# Patient Record
Sex: MALE | Race: WHITE
[De-identification: names, ages, dates, MRNs, and addresses within clinical notes are randomized per-mention and may not be internally consistent; named-entity substitution may affect disease eponyms.]

---

## 2017-05-11 ENCOUNTER — HOSPITAL ENCOUNTER (EMERGENCY)
Dept: HOSPITAL 77 - KA.ED | Age: 77
Discharge: HOME | End: 2017-05-11
Payer: MEDICARE

## 2017-05-11 VITALS — DIASTOLIC BLOOD PRESSURE: 60 MMHG | SYSTOLIC BLOOD PRESSURE: 132 MMHG

## 2017-05-11 DIAGNOSIS — Z79.82: ICD-10-CM

## 2017-05-11 DIAGNOSIS — E66.9: ICD-10-CM

## 2017-05-11 DIAGNOSIS — Z88.8: ICD-10-CM

## 2017-05-11 DIAGNOSIS — E78.00: ICD-10-CM

## 2017-05-11 DIAGNOSIS — Z87.891: ICD-10-CM

## 2017-05-11 DIAGNOSIS — Z88.5: ICD-10-CM

## 2017-05-11 DIAGNOSIS — F41.9: ICD-10-CM

## 2017-05-11 DIAGNOSIS — I10: ICD-10-CM

## 2017-05-11 DIAGNOSIS — F32.9: ICD-10-CM

## 2017-05-11 DIAGNOSIS — J06.9: Primary | ICD-10-CM

## 2017-05-11 DIAGNOSIS — Z79.899: ICD-10-CM

## 2017-05-11 DIAGNOSIS — Z90.49: ICD-10-CM

## 2017-05-11 DIAGNOSIS — I25.2: ICD-10-CM

## 2017-05-11 DIAGNOSIS — E11.9: ICD-10-CM

## 2017-05-11 DIAGNOSIS — Z88.1: ICD-10-CM

## 2017-05-11 DIAGNOSIS — Z98.49: ICD-10-CM

## 2017-05-11 DIAGNOSIS — K21.9: ICD-10-CM

## 2017-05-11 PROCEDURE — 71020: CPT

## 2017-05-11 PROCEDURE — 99283 EMERGENCY DEPT VISIT LOW MDM: CPT

## 2017-05-11 NOTE — EDM.PDOC
ED HPI GENERAL MEDICAL PROBLEM





- General


Chief Complaint: Respiratory Problem


Stated Complaint: COUGH


Time Seen by Provider: 05/11/17 20:15


Source of Information: Reports: Patient


History Limitations: Reports: No Limitations





- History of Present Illness


INITIAL COMMENTS - FREE TEXT/NARRATIVE: 





78 YO WM presents to ER complaining of nonproductive cough when lying supine. 

Pt reports his wife was seen in the clinic yesterday for similar symptoms and 

she was started on a z-pack, and patient was prescribed a z-pack as well as a 

prophylaxsis due to infection in the home. Pt reports he lied down tonight and 

started coughing prompting ER visit. Pt denies shortness of breath, denies 

chest pain, denies fever/chills. Pt reports nasal congestion with some drainage 

tonight. 


Onset: today


Duration: Day(s): (1)


Severity: mild


Improves with: Reports: Other (sitting up)


Worsens with: Denies: Breathing


Associated Symptoms: Reports: cough.  Denies: chest pain, cough w sputum, fever/

chills, nausea/vomiting, shortness of breath, syncope, weakness





- Related Data


 Allergies











Allergy/AdvReac Type Severity Reaction Status Date / Time


 


omeprazole magnesium Allergy Intermediate Cannot Verified 05/11/17 20:02





[From Prilosec]   Remember  


 


cimetidine [From Tagamet] Allergy  Cannot Verified 05/11/17 20:02





   Remember  


 


cimetidine HCl [From Tagamet] Allergy  Cannot Verified 05/11/17 20:02





   Remember  


 


codeine Allergy  Rash Verified 05/11/17 20:02


 


omeprazole [From Prilosec] Allergy  Cannot Verified 05/11/17 20:02





   Remember  











Home Meds: 


 Home Meds





Aspirin [Adult Low Dose Aspirin EC] 81 mg PO DAILY 01/29/14 [History]


Albuterol [Ventolin HFA] 1 inhaler INH Q4H PRN #1 inhaler 05/05/16 [Rx]


Allopurinol [Zyloprim] 300 mg PO DAILY #90 tablet 05/05/16 [Rx]


Esomeprazole [NexIUM] 40 mg PO ACBREAKFAST #90 cap 05/05/16 [Rx]


Furosemide [Lasix] 20 mg PO DAILY #90 tablet 05/05/16 [Rx]


Gemfibrozil [Lopid] 600 mg PO BIDAC #90 tablet 05/05/16 [Rx]


Simvastatin [Zocor] 20 mg PO BEDTIME #90 tablet 05/05/16 [Rx]


Tamsulosin [Flomax] 0.4 mg PO QAM #90 cap.er 05/05/16 [Rx]


busPIRone [Buspar] 10 mg PO BID #90 tablet 05/05/16 [Rx]


metFORMIN [Glucophage] 1,000 mg PO BIDM #90 tablet 05/05/16 [Rx]


traMADol HCl [Tramadol HCl] 50 mg PO TID PRN 08/06/16 [History]


Lisinopril 20 mg PO BID 01/30/17 [History]


Metoprolol Tartrate [Lopressor] 25 mg PO BID 01/30/17 [History]


chlordiazePOXIDE [Librium] 10 mg PO BID 01/30/17 [History]


Acetaminophen 650 mg PO Q6H PRN 01/31/17 [History]


Nystatin [IJD: Nystatin Cream] 1 gm TOP BID #1 tube 02/06/17 [Rx]


Azithromycin [Azithromycin] 250 mg PO DAILY 05/11/17 [History]


Benzonatate [Tessalon Perles] 100 mg PO TID PRN #20 cap 05/11/17 [Rx]


Cetirizine HCl [Zyrtec] 10 mg PO DAILYRT #30 tablet 05/11/17 [Rx]











Past Medical History


HEENT History: Reports: Cataract, Impaired vision


Cardiovascular History: Reports: High cholesterol, Hypertension, MI


Gastrointestinal History: Reports: Chronic constipation, GERD


Genitourinary History: Reports: BPH


Musculoskeletal History: Reports: Arthritis, Back pain, chronic


Psychiatric History: Reports: Anxiety, Dementia, Depression, Mood swings, Panic 

attack


Endocrine/Metabolic History: Reports: Diabetes, type II, Obesity/BMI 30+





- Infectious Disease History


Infectious Disease History: Reports: Measles





- Past Surgical History


HEENT Surgical History: Reports: Cataract surgery


Cardiovascular Surgical History: Reports: None


Respiratory Surgical History: Reports: None


GI Surgical History: Reports: Cholecystectomy, Colonoscopy, EGD, Hernia, 

abdominal


Endocrine Surgical History: Reports: None


Musculoskeletal Surgical History: Reports: None





Social & Family History





- Family History


HEENT: Reports: None


Cardiac: Reports: Hypertension, MI


Respiratory: Reports: None


GI: Reports: None


: Reports: None


OBGYN: Reports: None


Musculoskeletal: Reports: None


Neurological: Reports: None


Psychiatric: Reports: None


Endocrine/Metabolic: Reports: None


Hematologic: Reports: None


Immunologic: Reports: None


Dermatologic: Reports: None


Oncologic: Reports: Prostate





- Tobacco Use


Smoking Status *Q: Former Smoker


Years of Tobacco use: 5


Packs/Tins Daily: 1


Used Tobacco, but Quit: No


Month Tobacco Last Used: May


Second Hand Smoke Exposure: No





- Caffeine Use


Caffeine Use: Reports: Coffee, Soda, Tea





- Alcohol Use


Days Per Week of Alcohol Use: 7


Number of Drinks Per Day: 7


Total Drinks Per Week: 49





- Recreational Drug Use


Recreational Drug Use: No





- Living Situation & Occupation


Living situation: Reports: 


Occupation: retired





ED ROS GENERAL





- Review of Systems


Review Of Systems: See Below


Constitutional: Reports: No Symptoms


HEENT: Reports: No Symptoms


Respiratory: Reports: Cough.  Denies: Shortness of Breath, Wheezing, Pleuritic 

Chest Pain, Sputum, Hemoptysis


Cardiovascular: Reports: No Symptoms


Endocrine: Reports: No Symptoms


GI/Abdominal: Reports: No Symptoms


: Reports: No Symptoms


Musculoskeletal: Reports: No Symptoms


Skin: Reports: No Symptoms


Neurological: Reports: No Symptoms


Psychiatric: Reports: No Symptoms


Hematologic/Lymphatic: Reports: No Symptoms


Immunologic: Reports: No Symptoms





ED EXAM, GENERAL





- Physical Exam


Exam: See Below


Exam Limited By: No Limitations


General Appearance: Alert, WD/WN, No Apparent Distress


Ears: Normal External Exam, Normal Canal, Hearing Grossly Normal, Normal TMs


Nose: Normal Inspection, Normal Mucosa, No Blood, Clear Rhinorrhea


Throat/Mouth: Normal Inspection, Normal Lips, Normal Teeth, Normal Gums, Normal 

Oropharynx, Normal Voice, No Airway Compromise


Head: Atraumatic, Normocephalic


Neck: Normal Inspection, Supple, Non-Tender, Full Range of Motion


Respiratory/Chest: No Respiratory Distress, Lungs Clear, Normal Breath Sounds, 

No Accessory Muscle Use, Chest Non-Tender


Cardiovascular: Normal Peripheral Pulses, Regular Rate, Rhythm, No Edema, No 

Gallop, No JVD, No Murmur, No Rub


GI/Abdominal: Normal Bowel Sounds, Soft, Non-Tender, No Organomegaly, No 

Distention, No Abnormal Bruit, No Mass


Back Exam: Normal Inspection, Full Range of Motion, NT


Extremities: Normal Inspection, Normal Range of Motion, Non-Tender, Normal 

Capillary Refill, No Pedal Edema


Neurological: Alert, Oriented, CN II-XII Intact, Normal Cognition, Normal Gait, 

Normal Reflexes, No Motor/Sensory Deficits


Psychiatric: Normal Affect, Normal Mood


Skin Exam: Warm, Dry, Intact, Normal Color, No Rash


Lymphatic: No Adenopathy





Course





- Vital Signs


Last Recorded V/S: 


 Last Vital Signs











Temp  36.1 C   05/11/17 20:00


 


Pulse  60   05/11/17 20:00


 


Resp  22 H  05/11/17 20:00


 


BP  132/60   05/11/17 20:00


 


Pulse Ox  96   05/11/17 20:00














- Orders/Labs/Meds


Orders: 


 Active Orders 24 hr











 Category Date Time Status


 


 Chest 2V [CR] Stat Exams  05/11/17 20:31 Taken











Meds: 


Medications














Discontinued Medications














Generic Name Dose Route Start Last Admin





  Trade Name Freq  PRN Reason Stop Dose Admin


 


Diphenhydramine HCl  50 mg  05/11/17 20:43  05/11/17 20:58





  Benadryl  PO  05/11/17 20:44  50 mg





  ONETIME ONE   Administration














Departure





- Departure


Time of Disposition: 21:00


Disposition: Home, Self-Care 01


Condition: good


Clinical Impression: 


 Cough





Upper respiratory infection


Qualifiers:


 URI type: unspecified viral URI Qualified Code(s): J06.9 - Acute upper 

respiratory infection, unspecified








- Discharge Information


Prescriptions: 


Benzonatate [Tessalon Perles] 100 mg PO TID PRN #20 cap


 PRN Reason: Cough


Cetirizine HCl [Zyrtec] 10 mg PO DAILYRT #30 tablet


Instructions:  Upper Respiratory Infection, Adult, Easy-to-Read


Referrals: 


Kayley Alvarado MD [Primary Care Provider] - 


Forms:  ED Department Discharge





- My Orders


Last 24 Hours: 


My Active Orders





05/11/17 20:31


Chest 2V [CR] Stat 














- Assessment/Plan


Last 24 Hours: 


My Active Orders





05/11/17 20:31


Chest 2V [CR] Stat 











Assessment:: 





1. nonproductive cough


2. upper respiratory tract infection


Plan: 





1. zyrtec 10mg PO QD


2. continue z-pack


3. benadryl 50mg PO QHS PRN


4. tesselon perles 100mg PO TID PRN


5. follow up in clinic for recheck or return to ER for worsening symptoms

## 2017-05-17 ENCOUNTER — HOSPITAL ENCOUNTER (OUTPATIENT)
Dept: HOSPITAL 77 - KA.MS | Age: 77
Setting detail: OBSERVATION
LOS: 2 days | Discharge: HOME | End: 2017-05-19
Attending: FAMILY MEDICINE | Admitting: PHYSICIAN ASSISTANT
Payer: MEDICARE

## 2017-05-17 DIAGNOSIS — Z79.84: ICD-10-CM

## 2017-05-17 DIAGNOSIS — R53.1: Primary | ICD-10-CM

## 2017-05-17 DIAGNOSIS — N40.0: ICD-10-CM

## 2017-05-17 DIAGNOSIS — E78.1: ICD-10-CM

## 2017-05-17 DIAGNOSIS — Z87.891: ICD-10-CM

## 2017-05-17 DIAGNOSIS — M10.9: ICD-10-CM

## 2017-05-17 DIAGNOSIS — K21.9: ICD-10-CM

## 2017-05-17 DIAGNOSIS — M13.861: ICD-10-CM

## 2017-05-17 DIAGNOSIS — Z79.899: ICD-10-CM

## 2017-05-17 DIAGNOSIS — Z90.49: ICD-10-CM

## 2017-05-17 DIAGNOSIS — Z88.8: ICD-10-CM

## 2017-05-17 DIAGNOSIS — M13.862: ICD-10-CM

## 2017-05-17 DIAGNOSIS — E11.9: ICD-10-CM

## 2017-05-17 DIAGNOSIS — F03.90: ICD-10-CM

## 2017-05-17 DIAGNOSIS — E78.5: ICD-10-CM

## 2017-05-17 DIAGNOSIS — F41.9: ICD-10-CM

## 2017-05-17 DIAGNOSIS — I10: ICD-10-CM

## 2017-05-17 LAB
CHLORIDE SERPL-SCNC: 100 MMOL/L (ref 98–115)
SODIUM SERPL-SCNC: 144 MMOL/L (ref 136–145)

## 2017-05-17 PROCEDURE — 73020 X-RAY EXAM OF SHOULDER: CPT

## 2017-05-17 PROCEDURE — 96361 HYDRATE IV INFUSION ADD-ON: CPT

## 2017-05-17 PROCEDURE — 85025 COMPLETE CBC W/AUTO DIFF WBC: CPT

## 2017-05-17 PROCEDURE — 96366 THER/PROPH/DIAG IV INF ADDON: CPT

## 2017-05-17 PROCEDURE — 82962 GLUCOSE BLOOD TEST: CPT

## 2017-05-17 PROCEDURE — G0378 HOSPITAL OBSERVATION PER HR: HCPCS

## 2017-05-17 PROCEDURE — 80053 COMPREHEN METABOLIC PANEL: CPT

## 2017-05-17 PROCEDURE — 85651 RBC SED RATE NONAUTOMATED: CPT

## 2017-05-17 PROCEDURE — 87040 BLOOD CULTURE FOR BACTERIA: CPT

## 2017-05-17 PROCEDURE — 96365 THER/PROPH/DIAG IV INF INIT: CPT

## 2017-05-17 PROCEDURE — 96375 TX/PRO/DX INJ NEW DRUG ADDON: CPT

## 2017-05-17 PROCEDURE — 84550 ASSAY OF BLOOD/URIC ACID: CPT

## 2017-05-17 PROCEDURE — 36415 COLL VENOUS BLD VENIPUNCTURE: CPT

## 2017-05-17 PROCEDURE — 94640 AIRWAY INHALATION TREATMENT: CPT

## 2017-05-17 PROCEDURE — 71010: CPT

## 2017-05-17 PROCEDURE — G0379 DIRECT REFER HOSPITAL OBSERV: HCPCS

## 2017-05-17 RX ADMIN — GUAIFENESIN PRN MG: 100 SOLUTION ORAL at 15:19

## 2017-05-17 RX ADMIN — ALBUTEROL SULFATE SCH MG: 2.5 SOLUTION RESPIRATORY (INHALATION) at 17:15

## 2017-05-17 RX ADMIN — ALBUTEROL SULFATE SCH MG: 2.5 SOLUTION RESPIRATORY (INHALATION) at 22:31

## 2017-05-17 RX ADMIN — LEVOFLOXACIN SCH MLS/HR: 500 INJECTION, SOLUTION INTRAVENOUS at 15:12

## 2017-05-18 VITALS — DIASTOLIC BLOOD PRESSURE: 74 MMHG | SYSTOLIC BLOOD PRESSURE: 142 MMHG

## 2017-05-18 RX ADMIN — ALBUTEROL SULFATE SCH: 2.5 SOLUTION RESPIRATORY (INHALATION) at 05:52

## 2017-05-18 RX ADMIN — GUAIFENESIN PRN MG: 100 SOLUTION ORAL at 16:20

## 2017-05-18 RX ADMIN — ALBUTEROL SULFATE SCH MG: 2.5 SOLUTION RESPIRATORY (INHALATION) at 16:20

## 2017-05-18 RX ADMIN — LEVOFLOXACIN SCH MLS/HR: 500 INJECTION, SOLUTION INTRAVENOUS at 13:09

## 2017-05-18 RX ADMIN — ALBUTEROL SULFATE SCH MG: 2.5 SOLUTION RESPIRATORY (INHALATION) at 21:45

## 2017-05-18 RX ADMIN — ALBUTEROL SULFATE SCH MG: 2.5 SOLUTION RESPIRATORY (INHALATION) at 11:12

## 2017-05-18 NOTE — PN
05/18/2017 PATIENT NAME:  ELIER MARTE

 

CHIEF COMPLAINT:  Feels great today and some improvement.

 

HISTORY:  This 77-year-old gentleman was admitted yesterday by an outlying

clinic, [facility] provider.  He had had some frequent hospitalizations

here for decreased mobility and some self-care neglect of himself.  However, he

came in with quite a bit of a harsh cough for several weeks, some shortness of

breath and does have some ill contacts with the spouse, she was concerned.  No

apparent fever.  He just became more weak and was having problems with an

ambulation, which is a slow progression for him.  He has always refused any

assisted living or long-term care.  He stated yesterday that his confusion and

his short-term memory was worsening.  He was short of breath yesterday.  A chest

x-ray was done at the [facility], which demonstrated some possible right

upper lobe atelectasis; however, there was no definitive consolidation, no

pneumothorax, cardiomegaly was negative.

 

PHYSICAL EXAMINATION:  VITAL SIGNS:  Normal.  Temperature 97, blood pressure

135/74, respiratory rate is normal, O2 sats on room air normal.

GENERAL:  The patient is alert and oriented, seems a little agitated.  He does

not want to be here.  However, he is somewhat cordial, he is very lucid, and he

is agreeable to stay at this time.

LUNGS:  Slightly rales in right lower base, little bit of rhonchus.

CV:  Regular rate and rhythm.

 

LABORATORY DATA:  White count 10.3, neutrophils are slightly elevated around

80%.  Sodium and potassium are normal.  Glucose 247.  Uric acid slightly

elevated at 7.8.

 

IMPRESSION AND PLAN:

1. Rule out pneumonia, could be early sign.  However, chest x-ray is not

    supporting this.  We will continue levofloxacin for community acquired,

    likely prodromal due to elevated percentage of neutrophils.  Aggressive

    incentive spirometer today.  Just continue azithromycin.

2. Self-care neglect, quite significant and profound on admission.

3. Weakness, mainly lower extremities.  Very difficult for him to get out of a

    sitting position.

4. Hypertension, which is stable.

5. Benign prostatic hypertrophy, stable.

6. Early dementia, stable.

7. Hyperlipidemia, stable.

8. Arthritis of both knees, right worse than left.  He is on Ultram.

9. Gout.  He is on allopurinol 300 mg daily.

10.Diabetes type 2.  He is on metformin.

11.Gastroesophageal reflux disease.  Allergic to the hospital's omeprazole.

    We will place on Zantac.

 

OVERALL PLAN:  This patient was admitted in observation.  Likely could

potentially be discharged within 24-48 hours.  Continue with levofloxacin and

ongoing monitoring, aggressive incentive spirometer today.

 

DD:  05/18/2017 11:47:11

DT:  05/18/2017 12:21:36

Job #:  [id number]

## 2017-05-19 RX ADMIN — ALBUTEROL SULFATE SCH MG: 2.5 SOLUTION RESPIRATORY (INHALATION) at 05:29

## 2017-05-19 RX ADMIN — GUAIFENESIN PRN MG: 100 SOLUTION ORAL at 05:31

## 2017-05-22 NOTE — DISCH
FINAL DIAGNOSES:

1. Bronchitis acute, pneumonia was ruled out.  Self-care neglect, quite

    significant and profound on admission.   consultation

    placed.  Weakness, mainly lower extremities.

2. Hypertension.

3. Benign prostatic hyperplasia, which is stable.

4. Early dementia, stable.

5. Hyperlipidemia, stable.

6. Arthritis of both knees, right worse than left.  He is on Ultram.

7. Gout slightly elevated uric acid level, however, he is on allopurinol.

8. Diabetes type 2 mellitus, on metformin.

9. Gastroesophageal reflux disease.

 

HISTORY:  This 77-year-old gentleman was admitted from [facility].  He has

been having quite frequent hospitalizations due to decreased mobility and some

self-care neglect of himself.  He did have developed a harsh cough, it has been

ongoing now for several weeks, some shortness of breath.  He has been exposed to

ill contacts with his spouse.  She was concerned, so she brought him in to see a

provider.  Chest x-ray was performed at [facility], which did not show any

focal consolidation, may have some upper right lobe atelectasis, does live at

home.  Multiple attempts regarding assisted living in nursing home have been

attempted.  He has always refused these, admitted to observation.  We did start

IV levofloxacin.

 

HOSPITAL COURSE:  Hospital course went well.  He did have a slightly elevated

white blood cell count of 10.3 on admission with percentage neutrophil 79%, they

were decreasing upon discharge.  White count on discharge was 9.0.  Electrolytes

were normal.  He did have a uric acid 7.8, which is slightly elevated.  Glucose

247.  Liver functions were within normal limits.  Vital signs were stable and

never ran a temperature.  Microbiology report no growth on blood cultures.  The

patient was demanding to be released.  Even of his day of discharge, he never

had any sputum production.  He did have a mild cough.  He was initially also

started on erythromycin, this was discontinued yesterday.  We kept on

levofloxacin.  Never became hypoxic.  He had intake and output, which was

adequate.  He never had any adverse reactions or side effects to any medications

or treatment.

 

PHYSICAL EXAMINATION:

VITAL SIGNS: On discharge, temperature 97.2, heart rate 86, blood pressure

142/74, O2 sats 94% and 96%, this respiratory rate 20.  He is on room air.

LUNGS: Slightly bronchi just to the right upper, however, rest was clear, no

crackles.

CV: Regular rate and rhythm.  He had no edema.  He felt he was ready for

discharge home.  , we got consulted with the patient regarding

any services he may need.  At this time, he is refusing any in-home therapy or

treatment.

 

MEDICATIONS:  On discharge:  Levofloxacin 500 mg p.o. x5 days.  He can start

that today (newly added).  He can continue on all his other home meds.

 

DISPOSITION:  He will be discharged home.  He has been counseled regarding this.

 have been working with me regarding home placement for him.  At

this time, we will send him home.  He is in observation status.  He will follow

up with the [facility] next week, report any fever, shortness of breath,

mucous production, or further weakness, or shortness of breath.

 

MEDICAL DECISION MAKIN minutes was spent on this discharge planning

process and  referrals and care coordination.

 

DD:  2017 10:35:04

DT:  2017 23:38:28

Job #:  [id number]

## 2017-08-11 ENCOUNTER — HOSPITAL ENCOUNTER (EMERGENCY)
Dept: HOSPITAL 77 - KA.ED | Age: 77
Discharge: LEFT BEFORE DISCHARGE | End: 2017-08-11
Payer: MEDICARE

## 2017-08-11 VITALS — SYSTOLIC BLOOD PRESSURE: 133 MMHG | DIASTOLIC BLOOD PRESSURE: 74 MMHG

## 2017-08-11 DIAGNOSIS — E66.9: ICD-10-CM

## 2017-08-11 DIAGNOSIS — I10: ICD-10-CM

## 2017-08-11 DIAGNOSIS — Z87.891: ICD-10-CM

## 2017-08-11 DIAGNOSIS — M19.90: ICD-10-CM

## 2017-08-11 DIAGNOSIS — Z98.49: ICD-10-CM

## 2017-08-11 DIAGNOSIS — Z88.5: ICD-10-CM

## 2017-08-11 DIAGNOSIS — E78.00: ICD-10-CM

## 2017-08-11 DIAGNOSIS — Z90.49: ICD-10-CM

## 2017-08-11 DIAGNOSIS — K21.9: ICD-10-CM

## 2017-08-11 DIAGNOSIS — Z79.84: ICD-10-CM

## 2017-08-11 DIAGNOSIS — Z79.899: ICD-10-CM

## 2017-08-11 DIAGNOSIS — S80.01XA: Primary | ICD-10-CM

## 2017-08-11 DIAGNOSIS — I25.2: ICD-10-CM

## 2017-08-11 DIAGNOSIS — Z87.01: ICD-10-CM

## 2017-08-11 DIAGNOSIS — F32.9: ICD-10-CM
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
## 2017-08-11 DIAGNOSIS — E11.9: ICD-10-CM

## 2017-08-11 DIAGNOSIS — R29.898: ICD-10-CM

## 2017-08-11 DIAGNOSIS — Z88.8: ICD-10-CM

## 2017-08-11 LAB
CHLORIDE SERPL-SCNC: 105 MMOL/L (ref 98–115)
SODIUM SERPL-SCNC: 144 MMOL/L (ref 136–145)

## 2017-08-11 PROCEDURE — 96374 THER/PROPH/DIAG INJ IV PUSH: CPT

## 2017-08-11 PROCEDURE — 99284 EMERGENCY DEPT VISIT MOD MDM: CPT

## 2017-08-11 PROCEDURE — 36415 COLL VENOUS BLD VENIPUNCTURE: CPT

## 2017-08-11 PROCEDURE — 71010: CPT

## 2017-08-11 PROCEDURE — 73560 X-RAY EXAM OF KNEE 1 OR 2: CPT

## 2017-08-11 PROCEDURE — 84550 ASSAY OF BLOOD/URIC ACID: CPT

## 2017-08-11 PROCEDURE — 80048 BASIC METABOLIC PNL TOTAL CA: CPT

## 2017-08-11 PROCEDURE — 85025 COMPLETE CBC W/AUTO DIFF WBC: CPT

## 2017-08-11 PROCEDURE — 81001 URINALYSIS AUTO W/SCOPE: CPT

## 2017-08-11 NOTE — EDM.PDOC
ED HPI GENERAL MEDICAL PROBLEM





- General


Chief Complaint: Lower Extremity Injury/Pain


Stated Complaint: FALL


Time Seen by Provider: 08/11/17 09:01


Source of Information: Reports: Patient, EMS


History Limitations: Reports: No Limitations





- History of Present Illness


INITIAL COMMENTS - FREE TEXT/NARRATIVE: 





78 YO WM presents to ER by EMS after fall at home. Pt reports right knee pain 

from fall. Pt states he was walking out of the bathroom and fell. Pt denies any 

dizziness, denies any shortness of breath, denies any chest pain or loss of 

consciousness. Pt reports he remembers the fall and that he fell forward onto 

his knees. Pt without any other complaints. 


Onset: Today


Onset Date: 08/11/17


Onset Time: 08:00


Duration: Hour(s): (1)


Location: Reports: Lower Extremity, Right


Quality: Reports: Ache


Severity: Moderate


Improves with: Reports: Rest


Worsens with: Reports: Movement


Associated Symptoms: Reports: No Other Symptoms


Treatments PTA: Reports: Cold Therapy





- Related Data


 Allergies











Allergy/AdvReac Type Severity Reaction Status Date / Time


 


omeprazole magnesium Allergy Intermediate Cannot Verified 08/11/17 08:47





[From Prilosec]   Remember  


 


cimetidine [From Tagamet] Allergy  Cannot Verified 08/11/17 08:47





   Remember  


 


cimetidine HCl [From Tagamet] Allergy  Cannot Verified 08/11/17 08:47





   Remember  


 


codeine Allergy  Rash Verified 08/11/17 08:47


 


omeprazole [From Prilosec] Allergy  Cannot Verified 08/11/17 08:47





   Remember  











Home Meds: 


 Home Meds





Albuterol [Ventolin HFA] 1 inhaler INH Q4H PRN #1 inhaler 05/05/16 [Rx]


Allopurinol [Zyloprim] 300 mg PO DAILY #90 tablet 05/05/16 [Rx]


Esomeprazole [NexIUM] 40 mg PO ACBREAKFAST #90 cap 05/05/16 [Rx]


Furosemide [Lasix] 20 mg PO DAILY #90 tablet 05/05/16 [Rx]


Gemfibrozil [Lopid] 600 mg PO BIDAC #90 tablet 05/05/16 [Rx]


Simvastatin [Zocor] 20 mg PO BEDTIME #90 tablet 05/05/16 [Rx]


Tamsulosin [Flomax] 0.4 mg PO QAM #90 cap.er 05/05/16 [Rx]


busPIRone [Buspar] 10 mg PO BID #90 tablet 05/05/16 [Rx]


metFORMIN [Glucophage] 1,000 mg PO BIDM #90 tablet 05/05/16 [Rx]


traMADol HCl [Tramadol HCl] 2 tab PO Q8H PRN 08/06/16 [History]


Lisinopril 20 mg PO BID 01/30/17 [History]


Metoprolol Tartrate [Lopressor] 25 mg PO BID 01/30/17 [History]


chlordiazePOXIDE [Librium] 10 mg PO BID 01/30/17 [History]


Acetaminophen 650 mg PO Q6H PRN 01/31/17 [History]











Past Medical History


HEENT History: Reports: Cataract, Impaired Vision


Cardiovascular History: Reports: High Cholesterol, Hypertension, MI


Respiratory History: Reports: Pneumonia, Recurrent


Gastrointestinal History: Reports: Chronic Constipation, GERD


Genitourinary History: Reports: BPH


Musculoskeletal History: Reports: Arthritis, Back Pain, Chronic


Psychiatric History: Reports: Anxiety, Dementia, Depression, Mood Swings, Panic 

Attack


Endocrine/Metabolic History: Reports: Diabetes, Type II, Obesity/BMI 30+





- Infectious Disease History


Infectious Disease History: Reports: Measles





- Past Surgical History


HEENT Surgical History: Reports: Cataract Surgery


Respiratory Surgical History: Reports: None


GI Surgical History: Reports: Cholecystectomy, Colonoscopy, EGD, Hernia, 

Abdominal





Social & Family History





- Family History


HEENT: Reports: None


Cardiac: Reports: Hypertension, MI


Respiratory: Reports: None


GI: Reports: None


: Reports: None


OBGYN: Reports: None


Musculoskeletal: Reports: None


Neurological: Reports: None


Psychiatric: Reports: None


Endocrine/Metabolic: Reports: None


Hematologic: Reports: None


Immunologic: Reports: None


Dermatologic: Reports: None


Oncologic: Reports: Prostate





- Tobacco Use


Smoking Status *Q: Former Smoker


Years of Tobacco use: 5


Packs/Tins Daily: 1


Used Tobacco, but Quit: Yes


Month Tobacco Last Used: May


Second Hand Smoke Exposure: No





- Caffeine Use


Caffeine Use: Reports: Coffee, Soda, Tea





- Alcohol Use


Days Per Week of Alcohol Use: 7


Number of Drinks Per Day: 7


Total Drinks Per Week: 49





- Recreational Drug Use


Recreational Drug Use: No





- Living Situation & Occupation


Living situation: Reports: 


Occupation: Retired





Review of Systems





- Review of Systems


Review Of Systems: See Below


Constitutional: Reports: No Symptoms


Eyes: Reports: No Symptoms


Ears: Reports: No Symptoms


Nose: Reports: No Symptoms


Mouth/Throat: Reports: No Symptoms


Respiratory: Reports: No Symptoms


Cardiovascular: Reports: No Symptoms


GI/Abdominal: Reports: No Symptoms


Genitourinary: Reports: No Symptoms


Musculoskeletal: Reports: Leg Pain (right knee pain)


Skin: Reports: No Symptoms


Neurological: Reports: No Symptoms


Psychiatric: Reports: No Symptoms





ED EXAM, GENERAL





- Physical Exam


Exam: See Below


Exam Limited By: No Limitations


General Appearance: Alert, WD/WN, No Apparent Distress


Nose: Normal Inspection, Normal Mucosa, No Blood


Throat/Mouth: Normal Inspection, Normal Lips, Normal Teeth, Normal Gums, Normal 

Oropharynx, Normal Voice, No Airway Compromise


Head: Atraumatic, Normocephalic


Neck: Normal Inspection, Supple, Non-Tender, Full Range of Motion


Respiratory/Chest: No Respiratory Distress, Lungs Clear, Normal Breath Sounds, 

No Accessory Muscle Use, Chest Non-Tender


Cardiovascular: Normal Peripheral Pulses, Regular Rate, Rhythm, No Gallop, No 

JVD, No Murmur, No Rub.  No: No Edema


GI/Abdominal: Normal Bowel Sounds, Soft, Non-Tender, No Organomegaly, No 

Distention, No Abnormal Bruit, No Mass


Back Exam: Normal Inspection, Full Range of Motion, NT


Extremities: Pedal Edema, Leg Pain (right knee pain with effusion), Increased 

Warmth


Neurological: Alert, Oriented, CN II-XII Intact, Normal Cognition, Normal Gait, 

Normal Reflexes, No Motor/Sensory Deficits


Psychiatric: Normal Affect, Normal Mood


Skin Exam: Warm, Dry, Intact, Normal Color, No Rash


Lymphatic: No Adenopathy





Course





- Vital Signs


Last Recorded V/S: 


 Last Vital Signs











Temp  37.1 C   08/11/17 08:26


 


Pulse  65   08/11/17 08:26


 


Resp  20   08/11/17 08:26


 


BP  186/88 H  08/11/17 08:26


 


Pulse Ox  94 L  08/11/17 08:26














- Orders/Labs/Meds


Orders: 


 Active Orders 24 hr











 Category Date Time Status


 


 Chest 1V Frontal [CR] Stat Exams  08/11/17 09:06 Taken


 


 Knee 1V or 2V Rt [CR] Stat Exams  08/11/17 09:06 Taken











Labs: 


 Laboratory Tests











  08/11/17 08/11/17 08/11/17 Range/Units





  08:30 09:40 09:40 


 


WBC   12.2 H   (5.0-10.0)  10^3/uL


 


RBC   4.68   (4.50-6.00)  10^6/uL


 


Hgb   14.0   (13.0-17.0)  g/dL


 


Hct   41.7   (40.0-52.0)  %


 


MCV   89.0   (82.0-92.0)  fL


 


MCH   29.9   (27.0-31.0)  pg


 


MCHC   33.7   (32.0-36.0)  g/dL


 


RDW   13.4   (11.5-14.5)  %


 


Plt Count   166   (150-300)  10^3/uL


 


MPV   7.6   (7.4-10.4)  fL


 


Neut % (Auto)   76.8 H   (50.0-70.0)  %


 


Lymph % (Auto)   15.1 L   (20.0-40.0)  %


 


Mono % (Auto)   7.0   (2.0-8.0)  %


 


Eos % (Auto)   0.9 L   (1.0-3.0)  %


 


Baso % (Auto)   0.2   (0.0-1.0)  %


 


Neut # (Auto)   9.4 H   (2.5-7.0)  10^3/uL


 


Lymph # (Auto)   1.8   (1.0-4.0)  10^3/uL


 


Mono # (Auto)   0.9 H   (0.1-0.8)  10^3/uL


 


Eos # (Auto)   0.1   (0.1-0.3)  10^3/uL


 


Baso # (Auto)   0.0   (0.0-0.1)  10^3/uL


 


Sodium    144  (136-145)  mmol/L


 


Potassium    3.9  (3.3-5.3)  mmol/L


 


Chloride    105  ()  mmol/L


 


Carbon Dioxide    30.2  (21.0-32.0)  mmol/L


 


BUN    14  (6-25)  mg/dL


 


Creatinine    0.73  (0.51-1.17)  mg/dL


 


Est Cr Clr Drug Dosing    94.25  mL/min


 


Estimated GFR (MDRD)    > 60  mL/min


 


Glucose    189 H  ()  mg/dL


 


Uric Acid     (2.6-7.2)  mg/dL


 


Calcium    9.1  (8.7-10.3)  mg/dL


 


Specimen Type  Urinvoid    


 


Urine Color  Light yellow    (YELLOW)  


 


Urine Appearance  Clear    (CLEAR)  


 


Urine pH  5.5    (5.0-9.0)  


 


Ur Specific Gravity  1.010    (1.005-1.030)  


 


Urine Protein  Negative    (NEGATIVE)  mg/dL


 


Urine Glucose (UA)  Negative    (NEGATIVE)  mg/dL


 


Urine Ketones  Negative    (NEGATIVE)  mg/dL


 


Urine Occult Blood  Negative    (NEGATIVE)  


 


Urine Nitrite  Negative    (NEGATIVE)  


 


Urine Bilirubin  Negative    (NEGATIVE)  


 


Urine Urobilinogen  0.2    (0.2-1.0)  E.U./dL


 


Ur Leukocyte Esterase  Trace H    (NEGATIVE)  


 


Urine RBC  Not seen    /HPF


 


Urine WBC  0-5    /HPF


 


Ur Epithelial Cells  Rare    /LPF


 


Urine Bacteria  Not seen    (NONE TO FEW)  /HPF














  08/11/17 Range/Units





  09:40 


 


WBC   (5.0-10.0)  10^3/uL


 


RBC   (4.50-6.00)  10^6/uL


 


Hgb   (13.0-17.0)  g/dL


 


Hct   (40.0-52.0)  %


 


MCV   (82.0-92.0)  fL


 


MCH   (27.0-31.0)  pg


 


MCHC   (32.0-36.0)  g/dL


 


RDW   (11.5-14.5)  %


 


Plt Count   (150-300)  10^3/uL


 


MPV   (7.4-10.4)  fL


 


Neut % (Auto)   (50.0-70.0)  %


 


Lymph % (Auto)   (20.0-40.0)  %


 


Mono % (Auto)   (2.0-8.0)  %


 


Eos % (Auto)   (1.0-3.0)  %


 


Baso % (Auto)   (0.0-1.0)  %


 


Neut # (Auto)   (2.5-7.0)  10^3/uL


 


Lymph # (Auto)   (1.0-4.0)  10^3/uL


 


Mono # (Auto)   (0.1-0.8)  10^3/uL


 


Eos # (Auto)   (0.1-0.3)  10^3/uL


 


Baso # (Auto)   (0.0-0.1)  10^3/uL


 


Sodium   (136-145)  mmol/L


 


Potassium   (3.3-5.3)  mmol/L


 


Chloride   ()  mmol/L


 


Carbon Dioxide   (21.0-32.0)  mmol/L


 


BUN   (6-25)  mg/dL


 


Creatinine   (0.51-1.17)  mg/dL


 


Est Cr Clr Drug Dosing   mL/min


 


Estimated GFR (MDRD)   mL/min


 


Glucose   ()  mg/dL


 


Uric Acid  6.6  (2.6-7.2)  mg/dL


 


Calcium   (8.7-10.3)  mg/dL


 


Specimen Type   


 


Urine Color   (YELLOW)  


 


Urine Appearance   (CLEAR)  


 


Urine pH   (5.0-9.0)  


 


Ur Specific Gravity   (1.005-1.030)  


 


Urine Protein   (NEGATIVE)  mg/dL


 


Urine Glucose (UA)   (NEGATIVE)  mg/dL


 


Urine Ketones   (NEGATIVE)  mg/dL


 


Urine Occult Blood   (NEGATIVE)  


 


Urine Nitrite   (NEGATIVE)  


 


Urine Bilirubin   (NEGATIVE)  


 


Urine Urobilinogen   (0.2-1.0)  E.U./dL


 


Ur Leukocyte Esterase   (NEGATIVE)  


 


Urine RBC   /HPF


 


Urine WBC   /HPF


 


Ur Epithelial Cells   /LPF


 


Urine Bacteria   (NONE TO FEW)  /HPF











Meds: 


Medications














Discontinued Medications














Generic Name Dose Route Start Last Admin





  Trade Name Freq  PRN Reason Stop Dose Admin


 


Ketorolac Tromethamine  30 mg  08/11/17 09:36  08/11/17 09:44





  Toradol  IVPUSH  08/11/17 09:37  30 mg





  ONETIME ONE   Administration














- Radiology Interpretation


Free Text/Narrative:: 





right knee- NAD


CXR- NAD








Departure





- Departure


Time of Disposition: 10:17


Disposition: Against Medical Advice 07


Condition: Fair


Clinical Impression: 


 Weakness of both lower extremities





Contusion of right knee


Qualifiers:


 Encounter type: initial encounter Qualified Code(s): S80.01XA - Contusion of 

right knee, initial encounter





Fall


Qualifiers:


 Encounter type: initial encounter Qualified Code(s): W19.XXXA - Unspecified 

fall, initial encounter








- Discharge Information


Instructions:  Knee Pain, Fall Prevention in the Home, Easy-to-Read


Referrals: 


Kayley Alvarado MD [Primary Care Provider] - 


Forms:  ED Department Discharge





- My Orders


Last 24 Hours: 


My Active Orders





08/11/17 09:06


Chest 1V Frontal [CR] Stat 


Knee 1V or 2V Rt [CR] Stat 














- Assessment/Plan


Last 24 Hours: 


My Active Orders





08/11/17 09:06


Chest 1V Frontal [CR] Stat 


Knee 1V or 2V Rt [CR] Stat 











Assessment:: 





1. right knee pain


2. weakness due to deconditioning


3. inability to ambulate or transfer from bed to walker due to weakness





Plan: 





1. discussed case with Mark Esquivel- swing bed admission- Pt adamant about 

discharge, refusing hospitalization. Pt will be discharged against medical 

advice. 


2. physical therapy


3. supportive care


4. Home health- This is a face to face encounter for home health services- pt 

needs nursing, physical therapy and occupational therapy due to potential for 

hospitalization related to frequent falls. Pt needs home health therapy program

, home safety assessment, strength and endurance training due to limited 

strength due to muscle weakness, and unsteady gait.

## 2017-12-06 ENCOUNTER — HOSPITAL ENCOUNTER (INPATIENT)
Dept: HOSPITAL 77 - KA.MS | Age: 77
Discharge: NURSING, REHAB OR LONG-TERM CARE | DRG: 442 | End: 2017-12-06
Attending: FAMILY MEDICINE | Admitting: PHYSICIAN ASSISTANT
Payer: MEDICARE

## 2017-12-06 VITALS — SYSTOLIC BLOOD PRESSURE: 116 MMHG | DIASTOLIC BLOOD PRESSURE: 71 MMHG

## 2017-12-06 DIAGNOSIS — J98.11: ICD-10-CM

## 2017-12-06 DIAGNOSIS — R10.9: ICD-10-CM

## 2017-12-06 DIAGNOSIS — Z79.899: ICD-10-CM

## 2017-12-06 DIAGNOSIS — R74.0: ICD-10-CM

## 2017-12-06 DIAGNOSIS — M19.90: ICD-10-CM

## 2017-12-06 DIAGNOSIS — F41.9: ICD-10-CM

## 2017-12-06 DIAGNOSIS — I10: ICD-10-CM

## 2017-12-06 DIAGNOSIS — F03.90: ICD-10-CM

## 2017-12-06 DIAGNOSIS — K21.9: ICD-10-CM

## 2017-12-06 DIAGNOSIS — E66.9: ICD-10-CM

## 2017-12-06 DIAGNOSIS — E80.6: ICD-10-CM

## 2017-12-06 DIAGNOSIS — I51.7: ICD-10-CM

## 2017-12-06 DIAGNOSIS — K72.90: Primary | ICD-10-CM

## 2017-12-06 DIAGNOSIS — E78.1: ICD-10-CM

## 2017-12-06 DIAGNOSIS — E11.9: ICD-10-CM

## 2017-12-06 LAB
CHLORIDE SERPL-SCNC: 101 MMOL/L (ref 98–115)
SODIUM SERPL-SCNC: 141 MMOL/L (ref 136–145)

## 2017-12-06 PROCEDURE — C9113 INJ PANTOPRAZOLE SODIUM, VIA: HCPCS

## 2017-12-06 NOTE — PCM.DCSUM1
**Discharge Summary





- Hospital Course


Brief History: This is a 77 year old male who was admitted from the clinic with 

a 2 day history of abdominal pain, nausea, and 1 episode of vomiting. Patient 

was seen in the clinic the day prior to admission and had workup with labs and 

CT. Hepatitis panel was negative. LFTs and bilirubin were found to be elevated. 

CT of the abdomen/pelvis was obtained and revealed: "1. Gallbladder is 

surgically absent. There is moderate intra and extrahepatic ductal dilation. 2. 

No evidence for any acute inflammatory process, free air or free fluid. 3. Right

-sided renal cyst. 4. Distal esophagus at least the visualized portion does 

appear diffusely thick walled. Question whether there may be some chronic 

inflammatory change or even infiltrative change. Consider follow-up examination 

with either an esophagram or direct visualization. 5. Cardiomegaly. Bibasilar 

atelectasis. 6. Multiple lymph nodes seen along the mesenteric root. Overall 

nonspecific finding." The patient was admitted for further monitoring and 

treatment of his acute condition.





- Discharge Data


Discharge Date: 12/06/17


Discharge Disposition: DC/Tfer to Acute Hospital 02


Condition: Fair





- Patient Summary/Data


Consults: 





Dr. Rocha, hospitalist, [facility]


Labs Pending at D/C: 





None





- Discharge Plan


Home Medications: 


 Home Meds





Albuterol [Ventolin HFA] 1 inhaler INH Q4H PRN #1 inhaler 05/05/16 [Rx]


Allopurinol [Zyloprim] 300 mg PO DAILY #90 tablet 05/05/16 [Rx]


Esomeprazole [NexIUM] 40 mg PO ACBREAKFAST #90 cap 05/05/16 [Rx]


Furosemide [Lasix] 20 mg PO DAILY #90 tablet 05/05/16 [Rx]


Tamsulosin [Flomax] 0.4 mg PO QAM #90 cap.er 05/05/16 [Rx]


metFORMIN [Glucophage] 1,000 mg PO BIDM #90 tablet 05/05/16 [Rx]


traMADol HCl [Tramadol HCl] 1 tab PO TID PRN 08/06/16 [History]


Lisinopril 20 mg PO BIDMEALS 01/30/17 [History]


Metoprolol Tartrate [Lopressor] 25 mg PO BID 01/30/17 [History]


Acetaminophen 650 mg PO Q6H PRN 01/31/17 [History]


Gemfibrozil [Lopid] 600 mg PO BIDAC 12/06/17 [History]


Lactulose 45 ml PO DAILY 12/06/17 [History]


Simethicone [Gas-X] 125 mg PO TIDMEALS 12/06/17 [History]


Simvastatin [Zocor] 20 mg PO DAILY 12/06/17 [History]


busPIRone [Buspar] 10 mg PO BIDMEALS 12/06/17 [History]


chlordiazePOXIDE [Librium] 10 mg PO BIDMEALS 12/06/17 [History]











- Discharge Summary/Plan Comment


DC Time >30 min.: No


Discharge Summary/Plan Comment: 





Date of admission: 12/6/17





Date of discharge: 12/6/17





Admitting Diagnosis: 


   Primary: Generalized abdominal pain, rule out ileus versus obstruction


   Secondary: Hypertension, Esophageal Reflux, Hyperglyceridemia, Type 2 

diabetes mellitus, Dementia, Osteoarthritis, Obesity, Anxiety





Final Diagnosis: 


   Primary: Transaminitis, Hyperbilirubinemia, Moderate intra and extrahepatic 

ductal diliation, Probable acute liver failure


   Secondary: Hypertension, Esophageal Reflux, Hyperglyceridemia, Type 2 

diabetes mellitus, Dementia, Osteoarthritis, Obesity, Anxiety





Procedures performed: None





Hospital Course: 





The patient's hospital course was quite short and uneventful. The patient 

received IV fluids, lactulose, simethicone, and toradol with improvement in his 

symptoms. He remained hemodynamically stable. His labs upon admission reflected 

a doubling of his bilirubin to 6.7 in 24 hours. Alk phos 141, , . 

WBC 9.1 with left shift present. INR 1.2. Due to elevation of LFTs and bilirubin

, consult was placed to Dr. Rocha, hospitalist at [facility]. 





New medications on discharge: None





New changes to home medications on discharge: 


-Holding the following: 


Metformin, nexium, lasix, tramadol





Regular home medications on discharge: 


-Albuterol HFA 1 inh every 4 hours PRN shortness of breath


-Tamsulosin 0.4 mg po daily


-Lopressor 25 mg po BID


-Allopurinol 300 mg po daily


-Buspar 10 mg po BID with meals


-Lactulose 45 mL po daily


-Tylenol 650 mg po every 6 hours PRN pain


-Metformin 1000 mg po BID


-Lasix 20 mg po daily


-Nexium 40 mg po daily


-Lisinopril 20 mg po BID


-Simvastatin 20 mg po daily


-Tramadol 1 tab po TID PRN pain


-Librium 10 mg po BID


-Lopid 600 mg po BID


-Simethicone 125 mg po TID with meals





Condition, Treatment, and Final Disposition: The patient was transferred in 

stable condition via ALS ambulance to [facility] for further workup and 

treatment of his condition. 





- General Info


Date of Service: 12/06/17


Functional Status: Reports: Pain Controlled





- Review of Systems


General: Denies: Fever, Chills


Pulmonary: Denies: Shortness of Breath


Cardiovascular: Denies: Chest Pain, Edema


Gastrointestinal: Reports: Flatus.  Denies: Abdominal Pain, Constipation, 

Diarrhea, Nausea, Vomiting


Genitourinary: Reports: No Symptoms





- Patient Data


Vitals - Most Recent: 


 Last Vital Signs











Temp  99.0 F   12/06/17 15:00


 


Pulse  72   12/06/17 17:48


 


Resp  20   12/06/17 15:00


 


BP  116/71   12/06/17 17:48


 


Pulse Ox  95   12/06/17 15:00











Weight - Most Recent: 276 lb


I&O - Last 24 hours: 


 Intake & Output











 12/06/17 12/06/17 12/06/17





 06:59 14:59 22:59


 


Intake Total  0 


 


Output Total  0 


 


Balance  0 











Lab Results - Last 24 hrs: 


 Laboratory Results - last 24 hr











  12/06/17 12/06/17 12/06/17 Range/Units





  13:15 13:15 17:30 


 


WBC  9.1    (5.0-10.0)  10^3/uL


 


RBC  4.59    (4.50-6.00)  10^6/uL


 


Hgb  13.6    (13.0-17.0)  g/dL


 


Hct  40.9    (40.0-52.0)  %


 


MCV  89.0    (82.0-92.0)  fL


 


MCH  29.7    (27.0-31.0)  pg


 


MCHC  33.3    (32.0-36.0)  g/dL


 


RDW  13.4    (11.5-14.5)  %


 


Plt Count  181    (150-300)  10^3/uL


 


MPV  7.2 L    (7.4-10.4)  fL


 


Neut % (Auto)  76.0 H    (50.0-70.0)  %


 


Lymph % (Auto)  12.7 L    (20.0-40.0)  %


 


Mono % (Auto)  9.5 H    (2.0-8.0)  %


 


Eos % (Auto)  1.5    (1.0-3.0)  %


 


Baso % (Auto)  0.3    (0.0-1.0)  %


 


Neut # (Auto)  6.9    (2.5-7.0)  10^3/uL


 


Lymph # (Auto)  1.2    (1.0-4.0)  10^3/uL


 


Mono # (Auto)  0.9 H    (0.1-0.8)  10^3/uL


 


Eos # (Auto)  0.1    (0.1-0.3)  10^3/uL


 


Baso # (Auto)  0.0    (0.0-0.1)  10^3/uL


 


INR     (0.9-1.1)  


 


Sodium   141   (136-145)  mmol/L


 


Potassium   3.6   (3.3-5.3)  mmol/L


 


Chloride   101   ()  mmol/L


 


Carbon Dioxide   25.8   (21.0-32.0)  mmol/L


 


BUN   12   (6-25)  mg/dL


 


Creatinine   0.70   (0.51-1.17)  mg/dL


 


Est Cr Clr Drug Dosing   98.29   mL/min


 


Estimated GFR (MDRD)   > 60   mL/min


 


Glucose   186 H   ()  mg/dL


 


POC Glucose    138 H  ()  mg/dl


 


Calcium   8.8   (8.7-10.3)  mg/dL


 


Total Bilirubin   6.7 H   (0.2-1.0)  mg/dL


 


AST   429 H   (15-37)  U/L


 


ALT   296 H   (12-78)  U/L


 


Alkaline Phosphatase   141 H   ()  IU/L


 


Total Protein   7.7   (6.4-8.2)  g/dL


 


Albumin   3.58   (3.00-4.80)  g/dL














  12/06/17 Range/Units





  18:40 


 


WBC   (5.0-10.0)  10^3/uL


 


RBC   (4.50-6.00)  10^6/uL


 


Hgb   (13.0-17.0)  g/dL


 


Hct   (40.0-52.0)  %


 


MCV   (82.0-92.0)  fL


 


MCH   (27.0-31.0)  pg


 


MCHC   (32.0-36.0)  g/dL


 


RDW   (11.5-14.5)  %


 


Plt Count   (150-300)  10^3/uL


 


MPV   (7.4-10.4)  fL


 


Neut % (Auto)   (50.0-70.0)  %


 


Lymph % (Auto)   (20.0-40.0)  %


 


Mono % (Auto)   (2.0-8.0)  %


 


Eos % (Auto)   (1.0-3.0)  %


 


Baso % (Auto)   (0.0-1.0)  %


 


Neut # (Auto)   (2.5-7.0)  10^3/uL


 


Lymph # (Auto)   (1.0-4.0)  10^3/uL


 


Mono # (Auto)   (0.1-0.8)  10^3/uL


 


Eos # (Auto)   (0.1-0.3)  10^3/uL


 


Baso # (Auto)   (0.0-0.1)  10^3/uL


 


INR  1.2 H  (0.9-1.1)  


 


Sodium   (136-145)  mmol/L


 


Potassium   (3.3-5.3)  mmol/L


 


Chloride   ()  mmol/L


 


Carbon Dioxide   (21.0-32.0)  mmol/L


 


BUN   (6-25)  mg/dL


 


Creatinine   (0.51-1.17)  mg/dL


 


Est Cr Clr Drug Dosing   mL/min


 


Estimated GFR (MDRD)   mL/min


 


Glucose   ()  mg/dL


 


POC Glucose   ()  mg/dl


 


Calcium   (8.7-10.3)  mg/dL


 


Total Bilirubin   (0.2-1.0)  mg/dL


 


AST   (15-37)  U/L


 


ALT   (12-78)  U/L


 


Alkaline Phosphatase   ()  IU/L


 


Total Protein   (6.4-8.2)  g/dL


 


Albumin   (3.00-4.80)  g/dL











Med Orders - Current: 


 Current Medications





Acetaminophen (Tylenol)  650 mg PO Q6H PRN


   PRN Reason: Pain


Albuterol (Ventolin Hfa)  0 gm INH Q4H PRN


   PRN Reason: Dyspnea


Allopurinol (Zyloprim)  300 mg PO DAILY YESENIA


Buspirone HCl (Buspar)  10 mg PO 0900,1800 YESENIA


   Last Admin: 12/06/17 17:47 Dose:  10 mg


Chlordiazepoxide HCl (Librium)  10 mg PO 0900,1800 [facility]


   Last Admin: 12/06/17 17:47 Dose:  10 mg


Gemfibrozil (Lopid)  600 mg PO BIDAC [facility]


   Last Admin: 12/06/17 17:15 Dose:  600 mg


Sodium Chloride (Normal Saline)  1,000 mls @ 50 mls/hr IV ASDIRECTED [facility]


   Last Admin: 12/06/17 13:00 Dose:  50 mls/hr


Insulin Aspart (Novolog)  0 unit SUBCUT WITHMEALSANDBED [facility]


   PRN Reason: Protocol


   Last Admin: 12/06/17 17:37 Dose:  Not Given


Ketorolac Tromethamine (Toradol)  30 mg IVPUSH Q6H PRN


   PRN Reason: Pain


   Stop: 12/11/17 15:01


   Last Admin: 12/06/17 15:24 Dose:  30 mg


Lactulose (Cephulac)  30 gm PO DAILY [facility]


Lisinopril (Prinivil)  20 mg PO 0900,1800 [facility]


   Last Admin: 12/06/17 17:47 Dose:  20 mg


Metoclopramide HCl (Reglan)  10 mg IVPUSH 0000,0600,1200,1800 [facility]


   Last Admin: 12/06/17 17:48 Dose:  10 mg


Metoprolol Tartrate (Lopressor)  25 mg PO 0900,1800 [facility]


   Last Admin: 12/06/17 17:48 Dose:  25 mg


Ondansetron HCl (Zofran)  4 mg IVPUSH Q4H PRN


   PRN Reason: Nausea/Vomiting


   Last Admin: 12/06/17 15:27 Dose:  4 mg


Pantoprazole Sodium (Protonix Iv***)  40 mg IVPUSH DAILY [facility]


   Last Admin: 12/06/17 14:01 Dose:  40 mg


Simethicone (Simethicone)  80 mg PO QIDACANDBED [facility]


   Last Admin: 12/06/17 17:14 Dose:  80 mg


Simvastatin (Zocor)  20 mg PO DAILY [facility]


Tamsulosin HCl (Flomax)  0.4 mg PO QAM [facility]





Discontinued Medications





Buspirone HCl (Buspar)  10 mg PO BID [facility]


Chlordiazepoxide HCl (Librium)  10 mg PO BID [facility]


Chlordiazepoxide HCl (Librium)  10 mg PO 0900,1800 [facility]


Lactulose (Cephulac)  30 gm PO DAILY [facility]


   Last Admin: 12/06/17 13:46 Dose:  Not Given


Lisinopril (Prinivil)  20 mg PO BID [facility]


Metoclopramide HCl (Reglan)  10 mg IVPUSH Q6H [facility]


   Last Admin: 12/06/17 14:02 Dose:  10 mg


Metoprolol Tartrate (Lopressor)  25 mg PO BID [facility]


Simethicone (Simethicone)  125 mg PO QID [facility]


   Last Admin: 12/06/17 14:25 Dose:  Not Given


Simvastatin (Zocor)  20 mg PO BEDTIME [facility]











- Exam


Quality Assessment: Denies: Supplemental Oxygen, Urine Catheter


General: Reports: Alert, Oriented, Cooperative, No Acute Distress


HEENT: Reports: Scleral Icterus (bilateral)


Lungs: Reports: Clear to Auscultation, Normal Respiratory Effort


Cardiovascular: Reports: Regular Rate, Regular Rhythm


GI/Abdominal Exam: Non-Tender, Distended, Abnormal Bowel Sounds (hypoactive x 4 

)


Extremities: Pedal Edema (trace-1+ bilaterally)


Skin: Reports: Warm, Moist, Other (jaundiced appearance of neck and shoulders)


Neurological: Reports: Normal Speech


Psy/Mental Status: Reports: Alert, Normal Affect, Normal Mood





*Q Meaningful Use (DIS)





- VTE *Q


VTE Criteria *Q: 








- Stroke *Q


Stroke Criteria *Q: 








- AMI *Q


AMI Criteria *Q:

## 2018-01-25 ENCOUNTER — HOSPITAL ENCOUNTER (EMERGENCY)
Dept: HOSPITAL 77 - KA.ED | Age: 78
Discharge: HOME | End: 2018-01-25
Payer: MEDICARE

## 2018-01-25 VITALS — DIASTOLIC BLOOD PRESSURE: 67 MMHG | SYSTOLIC BLOOD PRESSURE: 170 MMHG

## 2018-01-25 DIAGNOSIS — Z88.8: ICD-10-CM

## 2018-01-25 DIAGNOSIS — R10.11: Primary | ICD-10-CM

## 2018-01-25 DIAGNOSIS — I10: ICD-10-CM

## 2018-01-25 DIAGNOSIS — E78.00: ICD-10-CM

## 2018-01-25 DIAGNOSIS — Z79.899: ICD-10-CM

## 2018-01-25 DIAGNOSIS — E11.9: ICD-10-CM

## 2018-01-25 DIAGNOSIS — Z87.891: ICD-10-CM

## 2018-01-25 DIAGNOSIS — Z79.84: ICD-10-CM

## 2018-01-25 DIAGNOSIS — Z88.5: ICD-10-CM

## 2018-01-25 LAB
CHLORIDE SERPL-SCNC: 108 MMOL/L (ref 98–115)
SODIUM SERPL-SCNC: 148 MMOL/L (ref 136–145)

## 2018-01-25 PROCEDURE — 74177 CT ABD & PELVIS W/CONTRAST: CPT

## 2018-01-25 PROCEDURE — 83690 ASSAY OF LIPASE: CPT

## 2018-01-25 PROCEDURE — 81001 URINALYSIS AUTO W/SCOPE: CPT

## 2018-01-25 PROCEDURE — 93005 ELECTROCARDIOGRAM TRACING: CPT

## 2018-01-25 PROCEDURE — 96375 TX/PRO/DX INJ NEW DRUG ADDON: CPT

## 2018-01-25 PROCEDURE — 36415 COLL VENOUS BLD VENIPUNCTURE: CPT

## 2018-01-25 PROCEDURE — 80053 COMPREHEN METABOLIC PANEL: CPT

## 2018-01-25 PROCEDURE — 82553 CREATINE MB FRACTION: CPT

## 2018-01-25 PROCEDURE — 82550 ASSAY OF CK (CPK): CPT

## 2018-01-25 PROCEDURE — 84484 ASSAY OF TROPONIN QUANT: CPT

## 2018-01-25 PROCEDURE — 99284 EMERGENCY DEPT VISIT MOD MDM: CPT

## 2018-01-25 PROCEDURE — 96374 THER/PROPH/DIAG INJ IV PUSH: CPT

## 2018-01-25 PROCEDURE — 85025 COMPLETE CBC W/AUTO DIFF WBC: CPT

## 2018-01-25 NOTE — EDM.PDOC
ED HPI GENERAL MEDICAL PROBLEM





- General


Chief Complaint: Abdominal Pain


Stated Complaint: RUQ abd pain


Time Seen by Provider: 01/25/18 07:15


Source of Information: Reports: Patient


History Limitations: Reports: No Limitations





- History of Present Illness


INITIAL COMMENTS - FREE TEXT/NARRATIVE: 





76 YO WM presents to ER complaining of abdominal pain which began sometime in 

the middle of the night tonight. Pt reports pain is localized to the upper 

abdomen and radiates in a "bandlike" fashion. Pt reports a remote history of 

cholecystectomy. Pt denies any nausea/vomiting, no fever chills, no chest pain 

or shortness of breath. Pt states his pain has improved while in ER. Pt reports 

last BM was yesterday and denies any bloody or dark stools. 


Onset: Today


Duration: Hour(s): (3)


Location: Reports: Abdomen, Back


Quality: Reports: Ache


Severity: Mild


Improves with: Reports: None


Worsens with: Reports: None


Associated Symptoms: Reports: No Other Symptoms.  Denies: Chest Pain, Cough, 

Fever/Chills, Nausea/Vomiting, Shortness of Breath, Syncope


  ** Abdominal


Pain Score (Numeric/FACES): 6





- Related Data


 Allergies











Allergy/AdvReac Type Severity Reaction Status Date / Time


 


omeprazole magnesium Allergy Intermediate Cannot Verified 01/25/18 06:58





[From Prilosec]   Remember  


 


cimetidine [From Tagamet] Allergy  Cannot Verified 01/25/18 06:58





   Remember  


 


cimetidine HCl [From Tagamet] Allergy  Cannot Verified 01/25/18 06:58





   Remember  


 


codeine Allergy  Rash Verified 01/25/18 06:58


 


omeprazole [From Prilosec] Allergy  Cannot Verified 01/25/18 06:58





   Remember  











Home Meds: 


 Home Meds





Albuterol [Ventolin HFA] 1 inhaler INH Q4H PRN #1 inhaler 05/05/16 [Rx]


Allopurinol [Zyloprim] 300 mg PO DAILY #90 tablet 05/05/16 [Rx]


Esomeprazole [NexIUM] 40 mg PO ACBREAKFAST #90 cap 05/05/16 [Rx]


Furosemide [Lasix] 20 mg PO DAILY #90 tablet 05/05/16 [Rx]


Tamsulosin [Flomax] 0.4 mg PO QAM #90 cap.er 05/05/16 [Rx]


metFORMIN [Glucophage] 1,000 mg PO BIDM #90 tablet 05/05/16 [Rx]


traMADol HCl [Tramadol HCl] 1 tab PO TID PRN 08/06/16 [History]


Lisinopril 20 mg PO BIDMEALS 01/30/17 [History]


Metoprolol Tartrate [Lopressor] 25 mg PO BID 01/30/17 [History]


Acetaminophen 650 mg PO Q6H PRN 01/31/17 [History]


Gemfibrozil [Lopid] 600 mg PO BIDAC 12/06/17 [History]


Lactulose 45 ml PO DAILY 12/06/17 [History]


Simethicone [Gas-X] 125 mg PO TIDMEALS 12/06/17 [History]


Simvastatin [Zocor] 20 mg PO DAILY 12/06/17 [History]


busPIRone [Buspar] 10 mg PO BIDMEALS 12/06/17 [History]


chlordiazePOXIDE [Librium] 10 mg PO BIDMEALS 12/06/17 [History]











Past Medical History


HEENT History: Reports: Cataract, Impaired Vision


Cardiovascular History: Reports: High Cholesterol, Hypertension, MI


Respiratory History: Reports: Pneumonia, Recurrent


Gastrointestinal History: Reports: Chronic Constipation, GERD


Genitourinary History: Reports: BPH


Musculoskeletal History: Reports: Arthritis, Back Pain, Chronic


Psychiatric History: Reports: Anxiety, Dementia, Depression, Mood Swings, Panic 

Attack


Endocrine/Metabolic History: Reports: Diabetes, Type II, Obesity/BMI 30+





- Infectious Disease History


Infectious Disease History: Reports: Measles





- Past Surgical History


HEENT Surgical History: Reports: Cataract Surgery


Respiratory Surgical History: Reports: None


GI Surgical History: Reports: Cholecystectomy, Colonoscopy, EGD, Hernia, 

Abdominal





Social & Family History





- Family History


HEENT: Reports: None


Cardiac: Reports: Hypertension, MI


Respiratory: Reports: None


GI: Reports: None


: Reports: None


OBGYN: Reports: None


Musculoskeletal: Reports: None


Neurological: Reports: None


Psychiatric: Reports: None


Endocrine/Metabolic: Reports: None


Hematologic: Reports: None


Immunologic: Reports: None


Dermatologic: Reports: None


Oncologic: Reports: Prostate





- Tobacco Use


Smoking Status *Q: Former Smoker


Years of Tobacco use: 5


Packs/Tins Daily: 1


Used Tobacco, but Quit: Yes


Month Tobacco Last Used: May


Second Hand Smoke Exposure: No





- Caffeine Use


Caffeine Use: Reports: Coffee





- Alcohol Use


Days Per Week of Alcohol Use: 7


Number of Drinks Per Day: 7


Total Drinks Per Week: 49





- Recreational Drug Use


Recreational Drug Use: No





- Living Situation & Occupation


Living situation: Reports: 


Occupation: Retired





ED ROS GENERAL





- Review of Systems


Review Of Systems: See Below


Constitutional: Reports: No Symptoms


HEENT: Reports: No Symptoms


Respiratory: Reports: No Symptoms


Cardiovascular: Reports: No Symptoms


Endocrine: Reports: No Symptoms


GI/Abdominal: Reports: Abdominal Pain.  Denies: Black Stool, Bloody Stool, 

Constipation, Diarrhea, Decreased Appetite, Hematemesis, Hematochezia, Melena, 

Nausea, Vomiting


: Reports: No Symptoms


Musculoskeletal: Reports: No Symptoms


Skin: Reports: No Symptoms


Neurological: Reports: No Symptoms


Psychiatric: Reports: No Symptoms


Hematologic/Lymphatic: Reports: No Symptoms


Immunologic: Reports: No Symptoms





ED EXAM, GI/ABD





- Physical Exam


Exam: See Below


Exam Limited By: No Limitations


General Appearance: Alert, WD/WN, No Apparent Distress


Head: Atraumatic, Normocephalic


Neck: Normal Inspection, Supple, Non-Tender, Full Range of Motion


Respiratory/Chest: No Respiratory Distress, Lungs Clear, Normal Breath Sounds, 

No Accessory Muscle Use, Chest Non-Tender


Cardiovascular: Normal Peripheral Pulses, Regular Rate, Rhythm, No Edema, No 

Gallop, No JVD, No Murmur, No Rub


GI/Abdominal Exam: Normal Bowel Sounds, Soft, No Organomegaly, No Abnormal Bruit

, No Mass, Pelvis Stable, Tender (RUQ pain)


Back Exam: Normal Inspection, Full Range of Motion, NT


Extremities: Normal Inspection, Normal Range of Motion, Non-Tender, Normal 

Capillary Refill, No Pedal Edema


Neurological: Alert, Oriented, CN II-XII Intact, Normal Cognition, Normal Gait, 

Normal Reflexes, No Motor/Sensory Deficits


Psychiatric: Normal Affect, Normal Mood


Skin Exam: Warm, Dry, Intact, Normal Color, No Rash


Lymphatic: No Adenopathy





Course





- Vital Signs


Last Recorded V/S: 


 Last Vital Signs











Temp  37.1 C   01/25/18 07:03


 


Pulse  80   01/25/18 07:25


 


Resp  28 H  01/25/18 07:25


 


BP  170/67 H  01/25/18 07:25


 


Pulse Ox  93 L  01/25/18 07:25














- Orders/Labs/Meds


Orders: 


 Active Orders 24 hr











 Category Date Time Status


 


 EKG Documentation Completion [RC] ASDIRECTED Care  01/25/18 07:39 Active


 


 Abdomen Pelvis w Cont [CT] Stat Exams  01/25/18 07:16 Taken


 


 Iopamidol [Isovue-300 (61%)] Med  01/25/18 07:23 Active





 75 ml IV .AS DIRECTED PRN   


 


 Sodium Chloride 0.9% [Syrex Flush] Med  01/25/18 07:19 Active





 5 ml FLUSH Q8HR PRN   


 


 Saline Lock Insert [OM.PC] Routine Oth  01/25/18 07:19 Ordered








 Medication Orders





Iopamidol (Isovue-300 (61%))  75 ml IV .AS DIRECTED PRN


   PRN Reason: FOR RADIOLOGY EXAM


Sodium Chloride (Syrex Flush)  5 ml FLUSH Q8HR PRN


   PRN Reason: Keep Vein Open








Labs: 


 Laboratory Tests











  01/25/18 01/25/18 01/25/18 Range/Units





  06:15 07:30 07:30 


 


WBC   8.7   (5.0-10.0)  10^3/uL


 


RBC   4.51   (4.50-6.00)  10^6/uL


 


Hgb   13.2   (13.0-17.0)  g/dL


 


Hct   41.9   (40.0-52.0)  %


 


MCV   92.9 H   (82.0-92.0)  fL


 


MCH   29.3   (27.0-31.0)  pg


 


MCHC   31.6 L   (32.0-36.0)  g/dL


 


RDW   14.0   (11.5-14.5)  %


 


Plt Count   184   (150-300)  10^3/uL


 


MPV   7.4   (7.4-10.4)  fL


 


Neut % (Auto)   Not Reportable   


 


Lymph % (Auto)   Not Reportable   


 


Mono % (Auto)   Not Reportable   


 


Eos % (Auto)   Not Reportable   


 


Baso % (Auto)   Not Reportable   


 


Neut # (Auto)   Not Reportable   


 


Lymph # (Auto)   Not Reportable   


 


Mono # (Auto)   Not Reportable   


 


Eos # (Auto)   Not Reportable   


 


Baso # (Auto)   Not Reportable   


 


Add Manual Diff   Yes   


 


Neutrophils % (Manual)   88 H   (50-70)  %


 


Lymphocytes % (Manual)   6 L   (20-40)  %


 


Monocytes % (Manual)   5   (2-8)  %


 


Eosinophils % (Manual)   1   (1-3)  %


 


Sodium    148 H  (136-145)  mmol/L


 


Potassium    3.1 L  (3.3-5.3)  mmol/L


 


Chloride    108  ()  mmol/L


 


Carbon Dioxide    26.8  (21.0-32.0)  mmol/L


 


BUN    13  (6-25)  mg/dL


 


Creatinine    0.69  (0.51-1.17)  mg/dL


 


Est Cr Clr Drug Dosing    99.71  mL/min


 


Estimated GFR (MDRD)    > 60  mL/min


 


Glucose    172 H  ()  mg/dL


 


Calcium    8.5 L  (8.7-10.3)  mg/dL


 


Total Bilirubin    1.0  (0.2-1.0)  mg/dL


 


AST    26  (15-37)  U/L


 


ALT    16  (12-78)  U/L


 


Alkaline Phosphatase    63  ()  IU/L


 


Creatine Kinase    27  ()  U/L


 


CK-MB (CK-2)    < 0.50  (0.00-4.30)  ng/mL


 


Troponin I    < 0.04  (0.00-0.070)  ng/mL


 


Total Protein    7.5  (6.4-8.2)  g/dL


 


Albumin    3.38  (3.00-4.80)  g/dL


 


Lipase    160  ()  U/L


 


Specimen Type  Urinvoid    


 


Urine Color  Yellow    (YELLOW)  


 


Urine Appearance  Clear    (CLEAR)  


 


Urine pH  6.5    (5.0-9.0)  


 


Ur Specific Gravity  >= 1.030    (1.005-1.030)  


 


Urine Protein  30 H    (NEGATIVE)  mg/dL


 


Urine Glucose (UA)  Negative    (NEGATIVE)  mg/dL


 


Urine Ketones  Negative    (NEGATIVE)  mg/dL


 


Urine Occult Blood  Trace-intact H    (NEGATIVE)  


 


Urine Nitrite  Negative    (NEGATIVE)  


 


Urine Bilirubin  Negative    (NEGATIVE)  


 


Urine Urobilinogen  0.2    (0.2-1.0)  E.U./dL


 


Ur Leukocyte Esterase  Negative    (NEGATIVE)  


 


Urine RBC  5-10 H    /HPF


 


Urine WBC  0-5    /HPF


 


Ur Epithelial Cells  Rare    /LPF


 


Urine Bacteria  Not seen    (NONE TO FEW)  /HPF











Meds: 


Medications











Generic Name Dose Route Start Last Admin





  Trade Name Freq  PRN Reason Stop Dose Admin


 


Iopamidol  75 ml  01/25/18 07:23  





  Isovue-300 (61%)  IV   





  .AS DIRECTED PRN   





  FOR RADIOLOGY EXAM   


 


Sodium Chloride  5 ml  01/25/18 07:19  





  Syrex Flush  FLUSH   





  Q8HR PRN   





  Keep Vein Open   














Discontinued Medications














Generic Name Dose Route Start Last Admin





  Trade Name Naunq  PRN Reason Stop Dose Admin


 


Morphine Sulfate  4 mg  01/25/18 07:39  01/25/18 07:42





  Morphine  IVPUSH  01/25/18 07:40  4 mg





  ONETIME ONE   Administration


 


Ondansetron HCl  4 mg  01/25/18 07:23  01/25/18 07:40





  Zofran  IVPUSH  01/25/18 07:24  4 mg





  ONETIME ONE   Administration


 


Sodium Chloride  50 ml  01/25/18 07:30  





  Normal Saline  FLUSH  01/25/18 07:31  





  ONETIME ONE   














- Radiology Interpretation


Free Text/Narrative:: 





CT abd/pelvis- increased retroperitoneal and mesenteric adenopathy





Departure





- Departure


Time of Disposition: 09:17


Disposition: Home, Self-Care 01


Condition: Good


Clinical Impression: 


Abdominal pain


Qualifiers:


 Abdominal location: right upper quadrant Qualified Code(s): R10.11 - Right 

upper quadrant pain








- Discharge Information


Instructions:  Abdominal Pain, Adult, Easy-to-Read


Referrals: 


Kayley Alvarado MD [Primary Care Provider] - 


Forms:  ED Department Discharge





- My Orders


Last 24 Hours: 


My Active Orders





01/25/18 07:16


Abdomen Pelvis w Cont [CT] Stat 





01/25/18 07:19


Sodium Chloride 0.9% [Syrex Flush]   5 ml FLUSH Q8HR PRN 


Saline Lock Insert [OM.PC] Routine 





01/25/18 07:23


Iopamidol [Isovue-300 (61%)]   75 ml IV .AS DIRECTED PRN 





01/25/18 07:39


EKG Documentation Completion [RC] ASDIRECTED 














- Assessment/Plan


Last 24 Hours: 


My Active Orders





01/25/18 07:16


Abdomen Pelvis w Cont [CT] Stat 





01/25/18 07:19


Sodium Chloride 0.9% [Syrex Flush]   5 ml FLUSH Q8HR PRN 


Saline Lock Insert [OM.PC] Routine 





01/25/18 07:23


Iopamidol [Isovue-300 (61%)]   75 ml IV .AS DIRECTED PRN 





01/25/18 07:39


EKG Documentation Completion [RC] ASDIRECTED 











Assessment:: 





1. abdominal pain- improved


Plan: 





1. discharge home- discussed with Mark Esquivel who will see him as an 

outpatient for further evaluation and treatment.


2. return to ER for worsening symptoms

## 2018-05-20 ENCOUNTER — HOSPITAL ENCOUNTER (OUTPATIENT)
Dept: HOSPITAL 77 - KA.ED | Age: 78
Setting detail: OBSERVATION
LOS: 2 days | Discharge: HOME | End: 2018-05-22
Attending: FAMILY MEDICINE | Admitting: PHYSICIAN ASSISTANT
Payer: MEDICARE

## 2018-05-20 DIAGNOSIS — Z79.84: ICD-10-CM

## 2018-05-20 DIAGNOSIS — E78.00: ICD-10-CM

## 2018-05-20 DIAGNOSIS — Z88.5: ICD-10-CM

## 2018-05-20 DIAGNOSIS — R79.89: ICD-10-CM

## 2018-05-20 DIAGNOSIS — N40.0: ICD-10-CM

## 2018-05-20 DIAGNOSIS — G45.9: Primary | ICD-10-CM

## 2018-05-20 DIAGNOSIS — R31.9: ICD-10-CM

## 2018-05-20 DIAGNOSIS — D64.9: ICD-10-CM

## 2018-05-20 DIAGNOSIS — E11.9: ICD-10-CM

## 2018-05-20 DIAGNOSIS — K21.9: ICD-10-CM

## 2018-05-20 DIAGNOSIS — F03.90: ICD-10-CM

## 2018-05-20 DIAGNOSIS — Z79.899: ICD-10-CM

## 2018-05-20 DIAGNOSIS — M10.9: ICD-10-CM

## 2018-05-20 DIAGNOSIS — I10: ICD-10-CM

## 2018-05-20 DIAGNOSIS — E78.5: ICD-10-CM

## 2018-05-20 DIAGNOSIS — I25.2: ICD-10-CM

## 2018-05-20 DIAGNOSIS — F41.1: ICD-10-CM

## 2018-05-20 DIAGNOSIS — F32.9: ICD-10-CM

## 2018-05-20 DIAGNOSIS — K59.09: ICD-10-CM

## 2018-05-20 DIAGNOSIS — E66.9: ICD-10-CM

## 2018-05-20 DIAGNOSIS — Z79.82: ICD-10-CM

## 2018-05-20 DIAGNOSIS — Z87.891: ICD-10-CM

## 2018-05-20 DIAGNOSIS — Z88.8: ICD-10-CM

## 2018-05-20 DIAGNOSIS — E87.6: ICD-10-CM

## 2018-05-20 DIAGNOSIS — E86.0: ICD-10-CM

## 2018-05-20 DIAGNOSIS — M19.90: ICD-10-CM

## 2018-05-20 LAB
CHLORIDE SERPL-SCNC: 106 MMOL/L (ref 98–115)
SODIUM SERPL-SCNC: 146 MMOL/L (ref 136–145)

## 2018-05-20 PROCEDURE — G0378 HOSPITAL OBSERVATION PER HR: HCPCS

## 2018-05-20 RX ADMIN — SODIUM CHLORIDE AND POTASSIUM CHLORIDE SCH MLS/HR: 9; 2.98 INJECTION, SOLUTION INTRAVENOUS at 21:35

## 2018-05-20 NOTE — EDM.PDOC
ED HPI GENERAL MEDICAL PROBLEM





- General


Chief Complaint: General


Stated Complaint: Slurred Speech 


Time Seen by Provider: 05/20/18 19:40


Source of Information: Reports: EMS


History Limitations: Reports: Altered Mental Status





- History of Present Illness


INITIAL COMMENTS - FREE TEXT/NARRATIVE: 





77 YO WM presents to ER by EMS with slurred speech which began 1 hour prior to 

arrival. Wife states  laid down for a nap around 1pm and when he woke 

she was having trouble understanding him. Wife denies any facial droop or 

obvious weakness. Pt was able to sit up on the side of the bed until EMS 

arrived. EMS states pt was alert and oriented with pronator drift, facial droop

, or obvious focal neurological deficits. Upon arrival to ER, slurred speach 

resolved. Pt is alert and oriented x 4 and recognizes staff and his wife 

appropriately. Pt denies any headache, chest pain, shortness of breath, 

diaphoresis, or recent illness. Pt states he feels fine. Pt doesn't understand 

why EMS was called.  


Onset: Sudden


Duration: Hour(s): (4)


Location: Reports: Generalized


Severity: Mild


Improves with: Reports: None


Worsens with: Reports: None


Associated Symptoms: Reports: No Other Symptoms.  Denies: Confusion, Chest Pain

, Diaphoresis, Fever/Chills, Headaches, Nausea/Vomiting, Shortness of Breath, 

Syncope, Weakness





- Related Data


 Allergies











Allergy/AdvReac Type Severity Reaction Status Date / Time


 


omeprazole magnesium Allergy Intermediate Cannot Verified 05/20/18 19:38





[From Prilosec]   Remember  


 


cimetidine [From Tagamet] Allergy  Cannot Verified 05/20/18 19:38





   Remember  


 


cimetidine HCl [From Tagamet] Allergy  Cannot Verified 05/20/18 19:38





   Remember  


 


codeine Allergy  Rash Verified 05/20/18 19:38


 


omeprazole [From Prilosec] Allergy  Cannot Verified 05/20/18 19:38





   Remember  











Home Meds: 


 Home Meds





Albuterol [Ventolin HFA] 1 inhaler INH Q4H PRN #1 inhaler 05/05/16 [Rx]


Allopurinol [Zyloprim] 300 mg PO DAILY #90 tablet 05/05/16 [Rx]


Furosemide [Lasix] 20 mg PO DAILY #90 tablet 05/05/16 [Rx]


Tamsulosin [Flomax] 0.4 mg PO QAM #90 cap.er 05/05/16 [Rx]


metFORMIN [Glucophage] 1,000 mg PO BIDM #90 tablet 05/05/16 [Rx]


Lisinopril 20 mg PO BIDMEALS 01/30/17 [History]


Metoprolol Tartrate [Lopressor] 25 mg PO BID 01/30/17 [History]


Gemfibrozil [Lopid] 600 mg PO BIDAC 12/06/17 [History]


Simethicone [Gas-X] 125 mg PO TIDMEALS 12/06/17 [History]


Simvastatin [Zocor] 20 mg PO DAILY 12/06/17 [History]


busPIRone [Buspar] 10 mg PO BIDMEALS 12/06/17 [History]


chlordiazePOXIDE [Librium] 10 mg PO BIDMEALS 12/06/17 [History]


Acetaminophen [Tylenol Arthritis] 1,300 mg PO TID PRN 05/20/18 [History]


Esomeprazole [NexIUM] 40 mg PO BID 05/20/18 [History]











Past Medical History


HEENT History: Reports: Cataract, Impaired Vision


Cardiovascular History: Reports: High Cholesterol, Hypertension, MI


Respiratory History: Reports: Pneumonia, Recurrent


Gastrointestinal History: Reports: Chronic Constipation, GERD


Genitourinary History: Reports: BPH


Musculoskeletal History: Reports: Arthritis, Back Pain, Chronic


Psychiatric History: Reports: Anxiety, Dementia, Depression, Mood Swings, Panic 

Attack


Endocrine/Metabolic History: Reports: Diabetes, Type II, Obesity/BMI 30+





- Infectious Disease History


Infectious Disease History: Reports: Measles





- Past Surgical History


HEENT Surgical History: Reports: Cataract Surgery


Respiratory Surgical History: Reports: None


GI Surgical History: Reports: Cholecystectomy, Colonoscopy, EGD, Hernia, 

Abdominal





Social & Family History





- Family History


HEENT: Reports: None


Cardiac: Reports: Hypertension, MI


Respiratory: Reports: None


GI: Reports: None


: Reports: None


OBGYN: Reports: None


Musculoskeletal: Reports: None


Neurological: Reports: None


Psychiatric: Reports: None


Endocrine/Metabolic: Reports: None


Hematologic: Reports: None


Immunologic: Reports: None


Dermatologic: Reports: None


Oncologic: Reports: Prostate





- Caffeine Use


Caffeine Use: Reports: Coffee





- Living Situation & Occupation


Living situation: Reports: 


Occupation: Retired





ED ROS GENERAL





- Review of Systems


Review Of Systems: See Below


Constitutional: Reports: No Symptoms


HEENT: Reports: No Symptoms


Respiratory: Reports: No Symptoms


Cardiovascular: Reports: No Symptoms


Endocrine: Reports: No Symptoms


GI/Abdominal: Reports: No Symptoms


: Reports: No Symptoms


Musculoskeletal: Reports: No Symptoms


Skin: Reports: No Symptoms


Neurological: Reports: No Symptoms, Trouble Speaking


Psychiatric: Reports: No Symptoms


Hematologic/Lymphatic: Reports: No Symptoms


Immunologic: Reports: No Symptoms





ED EXAM, GENERAL





- Physical Exam


Exam: See Below


Exam Limited By: No Limitations


General Appearance: Alert, WD/WN, No Apparent Distress


Eye Exam: Bilateral Eye: EOMI, PERRL


Throat/Mouth: Normal Inspection, Normal Lips, Normal Teeth, Normal Gums, Normal 

Oropharynx, Normal Voice, No Airway Compromise


Head: Atraumatic, Normocephalic


Neck: Normal Inspection, Supple, Non-Tender, Full Range of Motion


Respiratory/Chest: No Respiratory Distress, Lungs Clear, Normal Breath Sounds, 

No Accessory Muscle Use, Chest Non-Tender


Cardiovascular: Normal Peripheral Pulses, Regular Rate, Rhythm, No Edema, No 

Gallop, No JVD, No Murmur, No Rub


GI/Abdominal: Normal Bowel Sounds, Soft, Non-Tender, No Organomegaly, No 

Distention, No Abnormal Bruit, No Mass


Back Exam: Normal Inspection, Full Range of Motion, NT


Extremities: Normal Inspection, Normal Range of Motion, Non-Tender, Normal 

Capillary Refill, No Pedal Edema


Neurological: Alert, Oriented, CN II-XII Intact, Normal Cognition, Normal Gait, 

Normal Reflexes, No Motor/Sensory Deficits


Psychiatric: Normal Affect, Normal Mood


Skin Exam: Warm, Dry, Intact, Normal Color, No Rash


Lymphatic: No Adenopathy





EKG INTERPRETATION


EKG Date: 05/20/18


Time: 20:13


Rhythm: NSR


Rate (Beats/Min): 53


Axis: LAD-Left Axis Deviation


P-Wave: Present


QRS: LBBB


ST-T: Normal


QT: Normal


Comparison: No Change





Course





- Vital Signs


Last Recorded V/S: 


 Last Vital Signs











Temp  35.8 C   05/20/18 20:40


 


Pulse  56 L  05/20/18 20:40


 


Resp  22 H  05/20/18 20:40


 


BP  134/85   05/20/18 20:40


 


Pulse Ox  94 L  05/20/18 20:40














- Orders/Labs/Meds


Orders: 


 Active Orders 24 hr











 Category Date Time Status


 


 Cardiac Monitoring [RC] .AS DIRECTED Care  05/20/18 19:38 Active


 


 EKG Documentation Completion [RC] ASDIRECTED Care  05/20/18 19:38 Active


 


 Oxygen Therapy Adult [Oxygen Therapy, ED] [RC] Care  05/20/18 19:38 Active





 ASDIRECTED   


 


 Peripheral IV Care [RC] .AS DIRECTED Care  05/20/18 19:39 Active


 


 Chest 1V Frontal [CR] Stat Exams  05/20/18 19:37 Ordered


 


 Head wo Cont [CT] Stat Exams  05/20/18 19:37 Ordered


 


 URINALYSIS W/MICROSCOPIC [UA W/MICROSCOPIC] [URIN] Stat Lab  05/20/18 20:10 

Ordered


 


 Sodium Chloride 0.9% [Syrex Flush] Med  05/20/18 19:38 Active





 5 ml FLUSH Q8HR PRN   


 


 Peripheral IV Insertion Adult [OM.PC] Routine Oth  05/20/18 19:38 Ordered


 


 EKG 12 Lead [EK] Routine Ther  05/20/18 19:37 Ordered








 Medication Orders





Sodium Chloride (Syrex Flush)  5 ml FLUSH Q8HR PRN


   PRN Reason: Keep Vein Open








Labs: 


 Laboratory Tests











  05/20/18 05/20/18 05/20/18 Range/Units





  19:50 19:50 19:50 


 


WBC  5.5    (5.0-10.0)  10^3/uL


 


RBC  4.48 L    (4.50-6.00)  10^6/uL


 


Hgb  12.9 L    (13.0-17.0)  g/dL


 


Hct  38.8 L    (40.0-52.0)  %


 


MCV  86.6  D    (82.0-92.0)  fL


 


MCH  28.8    (27.0-31.0)  pg


 


MCHC  33.2    (32.0-36.0)  g/dL


 


RDW  15.6 H    (11.5-14.5)  %


 


Plt Count  184    (150-300)  10^3/uL


 


MPV  7.0 L    (7.4-10.4)  fL


 


Neut % (Auto)  59.0    (50.0-70.0)  %


 


Lymph % (Auto)  29.6    (20.0-40.0)  %


 


Mono % (Auto)  8.6 H    (2.0-8.0)  %


 


Eos % (Auto)  2.6    (1.0-3.0)  %


 


Baso % (Auto)  0.2    (0.0-1.0)  %


 


Neut # (Auto)  3.3    (2.5-7.0)  10^3/uL


 


Lymph # (Auto)  1.6    (1.0-4.0)  10^3/uL


 


Mono # (Auto)  0.5    (0.1-0.8)  10^3/uL


 


Eos # (Auto)  0.1    (0.1-0.3)  10^3/uL


 


Baso # (Auto)  0.0    (0.0-0.1)  10^3/uL


 


PT    10.8  (8.9-11.4)  SEC


 


INR    1.1  (0.9-1.1)  


 


APTT    26.6  (20.8-31.2)  SEC


 


Sodium   146 H   (136-145)  mmol/L


 


Potassium   2.9 L   (3.3-5.3)  mmol/L


 


Chloride   106   ()  mmol/L


 


Carbon Dioxide   27.1   (21.0-32.0)  mmol/L


 


BUN   13   (6-25)  mg/dL


 


Creatinine   1.02   (0.51-1.17)  mg/dL


 


Est Cr Clr Drug Dosing   64.46   mL/min


 


Estimated GFR (MDRD)   > 60   mL/min


 


Glucose   161 H   ()  mg/dL


 


Calcium   8.1 L   (8.7-10.3)  mg/dL


 


Total Bilirubin   0.4   (0.2-1.0)  mg/dL


 


AST   35   (15-37)  U/L


 


ALT   20   (12-78)  U/L


 


Alkaline Phosphatase   83   ()  IU/L


 


Creatine Kinase   33   ()  U/L


 


CK-MB (CK-2)   0.60   (0.00-4.30)  ng/mL


 


Troponin I   < 0.04   (0.00-0.070)  ng/mL


 


Total Protein   6.8   (6.4-8.2)  g/dL


 


Albumin   3.03   (3.00-4.80)  g/dL


 


Specimen Type     


 


Urine Color     (YELLOW)  


 


Urine Appearance     (CLEAR)  


 


Urine pH     (5.0-9.0)  


 


Ur Specific Gravity     (1.005-1.030)  


 


Urine Protein     (NEGATIVE)  mg/dL


 


Urine Glucose (UA)     (NEGATIVE)  mg/dL


 


Urine Ketones     (NEGATIVE)  mg/dL


 


Urine Occult Blood     (NEGATIVE)  


 


Urine Nitrite     (NEGATIVE)  


 


Urine Bilirubin     (NEGATIVE)  


 


Urine Urobilinogen     (0.2-1.0)  E.U./dL


 


Ur Leukocyte Esterase     (NEGATIVE)  


 


Urine RBC     /HPF


 


Urine WBC     /HPF


 


Ur Epithelial Cells     /LPF


 


Hyaline Casts     (NEGATIVE)  /LPF














  05/20/18 Range/Units





  20:20 


 


WBC   (5.0-10.0)  10^3/uL


 


RBC   (4.50-6.00)  10^6/uL


 


Hgb   (13.0-17.0)  g/dL


 


Hct   (40.0-52.0)  %


 


MCV   (82.0-92.0)  fL


 


MCH   (27.0-31.0)  pg


 


MCHC   (32.0-36.0)  g/dL


 


RDW   (11.5-14.5)  %


 


Plt Count   (150-300)  10^3/uL


 


MPV   (7.4-10.4)  fL


 


Neut % (Auto)   (50.0-70.0)  %


 


Lymph % (Auto)   (20.0-40.0)  %


 


Mono % (Auto)   (2.0-8.0)  %


 


Eos % (Auto)   (1.0-3.0)  %


 


Baso % (Auto)   (0.0-1.0)  %


 


Neut # (Auto)   (2.5-7.0)  10^3/uL


 


Lymph # (Auto)   (1.0-4.0)  10^3/uL


 


Mono # (Auto)   (0.1-0.8)  10^3/uL


 


Eos # (Auto)   (0.1-0.3)  10^3/uL


 


Baso # (Auto)   (0.0-0.1)  10^3/uL


 


PT   (8.9-11.4)  SEC


 


INR   (0.9-1.1)  


 


APTT   (20.8-31.2)  SEC


 


Sodium   (136-145)  mmol/L


 


Potassium   (3.3-5.3)  mmol/L


 


Chloride   ()  mmol/L


 


Carbon Dioxide   (21.0-32.0)  mmol/L


 


BUN   (6-25)  mg/dL


 


Creatinine   (0.51-1.17)  mg/dL


 


Est Cr Clr Drug Dosing   mL/min


 


Estimated GFR (MDRD)   mL/min


 


Glucose   ()  mg/dL


 


Calcium   (8.7-10.3)  mg/dL


 


Total Bilirubin   (0.2-1.0)  mg/dL


 


AST   (15-37)  U/L


 


ALT   (12-78)  U/L


 


Alkaline Phosphatase   ()  IU/L


 


Creatine Kinase   ()  U/L


 


CK-MB (CK-2)   (0.00-4.30)  ng/mL


 


Troponin I   (0.00-0.070)  ng/mL


 


Total Protein   (6.4-8.2)  g/dL


 


Albumin   (3.00-4.80)  g/dL


 


Specimen Type  Urinvoid  


 


Urine Color  Yellow  (YELLOW)  


 


Urine Appearance  Slightly cloudy H  (CLEAR)  


 


Urine pH  5.5  (5.0-9.0)  


 


Ur Specific Gravity  >= 1.030  (1.005-1.030)  


 


Urine Protein  >=300 H  (NEGATIVE)  mg/dL


 


Urine Glucose (UA)  Negative  (NEGATIVE)  mg/dL


 


Urine Ketones  Negative  (NEGATIVE)  mg/dL


 


Urine Occult Blood  Large H  (NEGATIVE)  


 


Urine Nitrite  Negative  (NEGATIVE)  


 


Urine Bilirubin  Negative  (NEGATIVE)  


 


Urine Urobilinogen  0.2  (0.2-1.0)  E.U./dL


 


Ur Leukocyte Esterase  Negative  (NEGATIVE)  


 


Urine RBC  >100 H  /HPF


 


Urine WBC  5-10 H  /HPF


 


Ur Epithelial Cells  Few  /LPF


 


Hyaline Casts  Few H  (NEGATIVE)  /LPF











Meds: 


Medications











Generic Name Dose Route Start Last Admin





  Trade Name Freq  PRN Reason Stop Dose Admin


 


Sodium Chloride  5 ml  05/20/18 19:38  





  Syrex Flush  FLUSH   





  Q8HR PRN   





  Keep Vein Open   





     





     





     














Discontinued Medications














Generic Name Dose Route Start Last Admin





  Trade Name Freq  PRN Reason Stop Dose Admin


 


Aspirin  324 mg  05/20/18 20:23  05/20/18 20:28





  Aspirin  PO  05/20/18 20:24  324 mg





  ONETIME ONE   Administration





     





     





     





     














- Radiology Interpretation


Free Text/Narrative:: 





CT Head- NAD


CXR- NAD





Departure





- Departure


Time of Disposition: 20:52


Disposition: Refer to Observation


Condition: Fair


Clinical Impression: 


 Hypokalemia, Dehydration





Transient ischemic attack (TIA)


Qualifiers:


 Transient cerebral ischemia type: unspecified Qualified Code(s): G45.9 - 

Transient cerebral ischemic attack, unspecified








- Discharge Information


Referrals: 


Kayley Alvarado MD [Primary Care Provider] - 


Forms:  ED Department Discharge





- My Orders


Last 24 Hours: 


My Active Orders





05/20/18 19:37


Chest 1V Frontal [CR] Stat 


Head wo Cont [CT] Stat 


EKG 12 Lead [EK] Routine 





05/20/18 19:38


Cardiac Monitoring [RC] .AS DIRECTED 


EKG Documentation Completion [RC] ASDIRECTED 


Oxygen Therapy Adult [Oxygen Therapy, ED] [RC] ASDIRECTED 


Sodium Chloride 0.9% [Syrex Flush]   5 ml FLUSH Q8HR PRN 


Peripheral IV Insertion Adult [OM.PC] Routine 





05/20/18 19:39


Peripheral IV Care [RC] .AS DIRECTED 





05/20/18 20:10


URINALYSIS W/MICROSCOPIC [UA W/MICROSCOPIC] [URIN] Stat 














- Assessment/Plan


Last 24 Hours: 


My Active Orders





05/20/18 19:37


Chest 1V Frontal [CR] Stat 


Head wo Cont [CT] Stat 


EKG 12 Lead [EK] Routine 





05/20/18 19:38


Cardiac Monitoring [RC] .AS DIRECTED 


EKG Documentation Completion [RC] ASDIRECTED 


Oxygen Therapy Adult [Oxygen Therapy, ED] [RC] ASDIRECTED 


Sodium Chloride 0.9% [Syrex Flush]   5 ml FLUSH Q8HR PRN 


Peripheral IV Insertion Adult [OM.PC] Routine 





05/20/18 19:39


Peripheral IV Care [RC] .AS DIRECTED 





05/20/18 20:10


URINALYSIS W/MICROSCOPIC [UA W/MICROSCOPIC] [URIN] Stat 











Assessment:: 





1. TIA- R/O CVA


2. Hypokalemia


3. mild dehydration


Plan: 





1. 23 hour Obs- Erwin Hack


2. replace K


3. NS @125cc/hr with 40meq potassium


4. repeat labs and trop I in am


5. supportive care

## 2018-05-21 LAB
CHLORIDE SERPL-SCNC: 107 MMOL/L (ref 98–115)
SODIUM SERPL-SCNC: 147 MMOL/L (ref 136–145)

## 2018-05-21 RX ADMIN — SODIUM CHLORIDE AND POTASSIUM CHLORIDE SCH MLS/HR: 9; 2.98 INJECTION, SOLUTION INTRAVENOUS at 05:20

## 2018-05-21 RX ADMIN — ASPIRIN SCH MG: 325 TABLET, DELAYED RELEASE ORAL at 08:58

## 2018-05-21 NOTE — PCM.HP
H&P History of Present Illness





- General


Date of Service: 05/21/18


Admit Problem/Dx: 


Transient ischemic attack


Source of Information: Patient, Family, Old Records, Provider, RN Notes 

Reviewed (Anuel Stevenson PA-C (ED provider))





- History of Present Illness


Initial Comments - Free Text/Narative: 


Mr. Angel was reportedly in his usual state of health when he laid down for a 

nap at around 1300 on 5/20/18. His wife states he then awoke a couple hours 

later and noted he was difficult to understand so she called EMS, who states 

that there was obvious slurred speech, but no other focal neurological 

abnormalities. 





Upon arrival at the [facility] ED, his slurred speech had resolved and the 

patient denied any complaints. Exam was without abnormality. Work-up was 

notable for hypokalemia, evidence for mild dehydration with urinary specific 

gravity >1.030, normal troponin and EKG, and normal CT head. He was recommended 

to be admitted to observation for TIA.





This morning, Mr. Angel continues to deny any complaints and states he still 

doesn't understand why he was brought to the hospital and desires leaving as 

soon as possible. His wife at bedside states that he didn't have any other 

abnormalities yesterday when he had the slurred speech. He denies any recent or 

current complaints of headache, vision changes, dysphagia, chest pain, 

palpitations, shortness of breath, weakness, numbness, or other concerns.


  ** left knee


Pain Score (Numeric/FACES): 5





- Related Data


Allergies/Adverse Reactions: 


 Allergies











Allergy/AdvReac Type Severity Reaction Status Date / Time


 


omeprazole magnesium Allergy Intermediate Cannot Verified 05/20/18 19:38





[From Prilosec]   Remember  


 


cimetidine [From Tagamet] Allergy  Cannot Verified 05/20/18 19:38





   Remember  


 


cimetidine HCl [From Tagamet] Allergy  Cannot Verified 05/20/18 19:38





   Remember  


 


codeine Allergy  Rash Verified 05/20/18 19:38


 


omeprazole [From Prilosec] Allergy  Cannot Verified 05/20/18 19:38





   Remember  











Home Medications: 


 Home Meds





Albuterol [Ventolin HFA] 1 inhaler INH Q4H PRN #1 inhaler 05/05/16 [Rx]


Furosemide [Lasix] 20 mg PO DAILY #90 tablet 05/05/16 [Rx]


Tamsulosin [Flomax] 0.4 mg PO QAM #90 cap.er 05/05/16 [Rx]


Lisinopril 20 mg PO BIDMEALS 01/30/17 [History]


Metoprolol Tartrate [Lopressor] 25 mg PO BID 01/30/17 [History]


Gemfibrozil [Lopid] 600 mg PO BIDAC 12/06/17 [History]


Simethicone [Gas-X] 125 mg PO TIDMEALS PRN 12/06/17 [History]


Simvastatin [Zocor] 20 mg PO DAILY 12/06/17 [History]


busPIRone [Buspar] 10 mg PO BIDMEALS 12/06/17 [History]


chlordiazePOXIDE [Librium] 10 mg PO BIDMEALS 12/06/17 [History]


Acetaminophen [Tylenol Arthritis] 1,300 mg PO TID PRN 05/20/18 [History]


Allopurinol [Zyloprim] 300 mg PO DAILY 05/21/18 [History]


Esomeprazole [NexIUM] 40 mg PO ACBREAKFAST 05/21/18 [History]


metFORMIN HCl [Metformin HCl] 1,000 mg PO BIDMEALS 05/21/18 [History]











Past Medical History


HEENT History: Reports: Cataract, Impaired Vision


Cardiovascular History: Reports: High Cholesterol, Hypertension, MI


Respiratory History: Reports: Pneumonia, Recurrent


Gastrointestinal History: Reports: Chronic Constipation, GERD


Genitourinary History: Reports: BPH


Musculoskeletal History: Reports: Arthritis, Back Pain, Chronic


Other Musculoskeletal History: chronic knee pain


Neurological History: Reports: TIA, Other (See Below)


Other Neuro History: this admit


Psychiatric History: Reports: Anxiety, Dementia, Depression, Mood Swings, Panic 

Attack


Endocrine/Metabolic History: Reports: Diabetes, Type II, Obesity/BMI 30+


Dermatologic History: Reports: Other (See Below)


Other Dermatologic History: scattered scabs, picks/scratches at skin





- Infectious Disease History


Infectious Disease History: Reports: Measles





- Past Surgical History


HEENT Surgical History: Reports: Cataract Surgery


Respiratory Surgical History: Reports: None


GI Surgical History: Reports: Cholecystectomy, Colonoscopy, EGD, Hernia, 

Abdominal





Social & Family History





- Family History


HEENT: Reports: None


Cardiac: Reports: Hypertension, MI


Respiratory: Reports: None


GI: Reports: None


: Reports: None


OBGYN: Reports: None


Musculoskeletal: Reports: None


Neurological: Reports: None


Psychiatric: Reports: None


Endocrine/Metabolic: Reports: None


Hematologic: Reports: None


Immunologic: Reports: None


Dermatologic: Reports: None


Oncologic: Reports: Prostate





- Tobacco Use


Smoking Status *Q: Former Smoker


Years of Tobacco use: 10


Used Tobacco, but Quit: Yes


Month/Year Tobacco Last Used: 12/1960





- Caffeine Use


Caffeine Use: Reports: Coffee, Soda





- Recreational Drug Use


Recreational Drug Use: No





- Living Situation & Occupation


Living situation: Reports: 


Occupation: Retired





H&P Review of Systems





- Review of Systems:


Review Of Systems: See Below


General: Denies: Fever, Chills, Weakness, Fatigue, Weight Loss, Weight Gain


HEENT: Denies: Dysphasia, Headaches, Hearing Changes, Rhinitis, Sore Throat, 

Vertigo, Visual Changes


Pulmonary: Denies: Shortness of Breath, Wheezing, Cough


Cardiovascular: Denies: Chest Pain, Palpitations, Lightheadedness, Syncope


Gastrointestinal: Denies: Abdominal Pain, Constipation, Diarrhea, Decreased 

Appetite, Difficulty Swallowing


Genitourinary: Denies: Dysuria, Frequency, Hematuria


Musculoskeletal: Denies: Neck Pain, Back Pain, Joint Pain


Skin: Denies: Bruising, Rash, Wound


Psychiatric: Denies: Confusion, Mood Lability, Hallucinations


Neurological: Denies: Confusion, Dizziness, Headache, Numbness, Paresthesia, 

Seizure, Syncope, Tingling, Tremors, Trouble Speaking, Weakness, Gait 

Disturbance





Exam





- Exam


Exam: See Below





- Vital Signs


Vital Signs: 


 Last Vital Signs











Temp  36.7 C   05/21/18 10:57


 


Pulse  52 L  05/21/18 10:57


 


Resp  24 H  05/21/18 10:57


 


BP  171/90 H  05/21/18 10:57


 


Pulse Ox  95   05/21/18 10:57











Weight: 126.28 kg





- Exam


Physical Exam Comments:: 


GENERAL: Well-appearing elderly white male sitting in bedside chair in no acute 

distress. Wife and friend at bedside.


HEENT: Normocephalic, atraumatic.  Conjunctiva clear.  Nares patent without 

discharge.  Mucous membranes moist, posterior pharynx unremarkable.


NECK: Supple, no masses.


CV: Mild bradycardia, regular rhythm, no murmurs, rubs, or gallops.  2+ radial 

pulses.


PULMONARY: Normal effort, clear to auscultation bilaterally, no wheezes, rales, 

or rhonchi.


ABDOMEN: Positive bowel sounds, soft, nontender, nondistended.


EXTREMITIES: 1+ edema to lower extremities to mid-shin bilaterally. No cyanosis 

or clubbing.


MUSCULOSKELETAL: Moves all extremities well.


NEUROLOGICAL: CN II-XII intact (with exception of R eye extraocular movements, 

which patient states is chronic). Sensation intact to light touch in distal 

extremities. Strength 5/5 in distal extremities. Finger-nose and heel-shin 

testing intact. 


DERMATOLOGIC: No rashes or suspicious lesions in exposed areas.


PSYCHIATRIC: Alert, interactive, cantankerous affect.





- Patient Data


Lab Results Last 24 hrs: 


 Laboratory Results - last 24 hr











  05/20/18 05/20/18 05/20/18 Range/Units





  19:50 19:50 19:50 


 


WBC  5.5    (5.0-10.0)  10^3/uL


 


RBC  4.48 L    (4.50-6.00)  10^6/uL


 


Hgb  12.9 L    (13.0-17.0)  g/dL


 


Hct  38.8 L    (40.0-52.0)  %


 


MCV  86.6  D    (82.0-92.0)  fL


 


MCH  28.8    (27.0-31.0)  pg


 


MCHC  33.2    (32.0-36.0)  g/dL


 


RDW  15.6 H    (11.5-14.5)  %


 


Plt Count  184    (150-300)  10^3/uL


 


MPV  7.0 L    (7.4-10.4)  fL


 


Neut % (Auto)  59.0    (50.0-70.0)  %


 


Lymph % (Auto)  29.6    (20.0-40.0)  %


 


Mono % (Auto)  8.6 H    (2.0-8.0)  %


 


Eos % (Auto)  2.6    (1.0-3.0)  %


 


Baso % (Auto)  0.2    (0.0-1.0)  %


 


Neut # (Auto)  3.3    (2.5-7.0)  10^3/uL


 


Lymph # (Auto)  1.6    (1.0-4.0)  10^3/uL


 


Mono # (Auto)  0.5    (0.1-0.8)  10^3/uL


 


Eos # (Auto)  0.1    (0.1-0.3)  10^3/uL


 


Baso # (Auto)  0.0    (0.0-0.1)  10^3/uL


 


PT    10.8  (8.9-11.4)  SEC


 


INR    1.1  (0.9-1.1)  


 


APTT    26.6  (20.8-31.2)  SEC


 


Sodium   146 H   (136-145)  mmol/L


 


Potassium   2.9 L   (3.3-5.3)  mmol/L


 


Chloride   106   ()  mmol/L


 


Carbon Dioxide   27.1   (21.0-32.0)  mmol/L


 


BUN   13   (6-25)  mg/dL


 


Creatinine   1.02   (0.51-1.17)  mg/dL


 


Est Cr Clr Drug Dosing   64.46   mL/min


 


Estimated GFR (MDRD)   > 60   mL/min


 


Glucose   161 H   ()  mg/dL


 


Hemoglobin A1c     (4.3-5.7)  %


 


Calcium   8.1 L   (8.7-10.3)  mg/dL


 


Total Bilirubin   0.4   (0.2-1.0)  mg/dL


 


AST   35   (15-37)  U/L


 


ALT   20   (12-78)  U/L


 


Alkaline Phosphatase   83   ()  IU/L


 


Creatine Kinase   33   ()  U/L


 


CK-MB (CK-2)   0.60   (0.00-4.30)  ng/mL


 


Troponin I   < 0.04   (0.00-0.070)  ng/mL


 


Total Protein   6.8   (6.4-8.2)  g/dL


 


Albumin   3.03   (3.00-4.80)  g/dL


 


Specimen Type     


 


Urine Color     (YELLOW)  


 


Urine Appearance     (CLEAR)  


 


Urine pH     (5.0-9.0)  


 


Ur Specific Gravity     (1.005-1.030)  


 


Urine Protein     (NEGATIVE)  mg/dL


 


Urine Glucose (UA)     (NEGATIVE)  mg/dL


 


Urine Ketones     (NEGATIVE)  mg/dL


 


Urine Occult Blood     (NEGATIVE)  


 


Urine Nitrite     (NEGATIVE)  


 


Urine Bilirubin     (NEGATIVE)  


 


Urine Urobilinogen     (0.2-1.0)  E.U./dL


 


Ur Leukocyte Esterase     (NEGATIVE)  


 


Urine RBC     /HPF


 


Urine WBC     /HPF


 


Ur Epithelial Cells     /LPF


 


Hyaline Casts     (NEGATIVE)  /LPF














  05/20/18 05/21/18 05/21/18 Range/Units





  20:20 07:15 07:15 


 


WBC   5.7   (5.0-10.0)  10^3/uL


 


RBC   4.54   (4.50-6.00)  10^6/uL


 


Hgb   12.6 L   (13.0-17.0)  g/dL


 


Hct   39.5 L   (40.0-52.0)  %


 


MCV   87.0   (82.0-92.0)  fL


 


MCH   27.8   (27.0-31.0)  pg


 


MCHC   31.9 L   (32.0-36.0)  g/dL


 


RDW   15.4 H   (11.5-14.5)  %


 


Plt Count   170   (150-300)  10^3/uL


 


MPV   7.4   (7.4-10.4)  fL


 


Neut % (Auto)   62.8   (50.0-70.0)  %


 


Lymph % (Auto)   26.4   (20.0-40.0)  %


 


Mono % (Auto)   8.7 H   (2.0-8.0)  %


 


Eos % (Auto)   1.6   (1.0-3.0)  %


 


Baso % (Auto)   0.5   (0.0-1.0)  %


 


Neut # (Auto)   3.6   (2.5-7.0)  10^3/uL


 


Lymph # (Auto)   1.5   (1.0-4.0)  10^3/uL


 


Mono # (Auto)   0.5   (0.1-0.8)  10^3/uL


 


Eos # (Auto)   0.1   (0.1-0.3)  10^3/uL


 


Baso # (Auto)   0.0   (0.0-0.1)  10^3/uL


 


PT     (8.9-11.4)  SEC


 


INR     (0.9-1.1)  


 


APTT     (20.8-31.2)  SEC


 


Sodium    147 H  (136-145)  mmol/L


 


Potassium    3.7  (3.3-5.3)  mmol/L


 


Chloride    107  ()  mmol/L


 


Carbon Dioxide    30.8  (21.0-32.0)  mmol/L


 


BUN    12  (6-25)  mg/dL


 


Creatinine    0.77  (0.51-1.17)  mg/dL


 


Est Cr Clr Drug Dosing    85.38  mL/min


 


Estimated GFR (MDRD)    > 60  mL/min


 


Glucose    132 H  ()  mg/dL


 


Hemoglobin A1c    6.8 H  (4.3-5.7)  %


 


Calcium    8.6 L  (8.7-10.3)  mg/dL


 


Total Bilirubin     (0.2-1.0)  mg/dL


 


AST     (15-37)  U/L


 


ALT     (12-78)  U/L


 


Alkaline Phosphatase     ()  IU/L


 


Creatine Kinase     ()  U/L


 


CK-MB (CK-2)     (0.00-4.30)  ng/mL


 


Troponin I    0.10 H*  (0.00-0.070)  ng/mL


 


Total Protein     (6.4-8.2)  g/dL


 


Albumin     (3.00-4.80)  g/dL


 


Specimen Type  Urinvoid    


 


Urine Color  Yellow    (YELLOW)  


 


Urine Appearance  Slightly cloudy H    (CLEAR)  


 


Urine pH  5.5    (5.0-9.0)  


 


Ur Specific Gravity  >= 1.030    (1.005-1.030)  


 


Urine Protein  >=300 H    (NEGATIVE)  mg/dL


 


Urine Glucose (UA)  Negative    (NEGATIVE)  mg/dL


 


Urine Ketones  Negative    (NEGATIVE)  mg/dL


 


Urine Occult Blood  Large H    (NEGATIVE)  


 


Urine Nitrite  Negative    (NEGATIVE)  


 


Urine Bilirubin  Negative    (NEGATIVE)  


 


Urine Urobilinogen  0.2    (0.2-1.0)  E.U./dL


 


Ur Leukocyte Esterase  Negative    (NEGATIVE)  


 


Urine RBC  >100 H    /HPF


 


Urine WBC  5-10 H    /HPF


 


Ur Epithelial Cells  Few    /LPF


 


Hyaline Casts  Few H    (NEGATIVE)  /LPF











Result Diagrams: 


 05/21/18 07:15





 05/21/18 07:15


Problem List Initiated/Reviewed/Updated: Yes


Orders Last 24hrs: 


 Active Orders 24 hr











 Category Date Time Status


 


 Patient Status [ADT] Routine ADT  05/20/18 20:54 Ordered


 


 Cardiac Monitoring [RC] .AS DIRECTED Care  05/20/18 19:38 Inactive


 


 Cardiac Monitoring [RC] 0300,0700,1100,1500,1900,2300 Care  05/20/18 20:55 

Active


 


 EKG Documentation Completion [RC] ASDIRECTED Care  05/20/18 19:38 Inactive


 


 Oxygen Therapy [RC] .PRN Care  05/20/18 20:54 Active


 


 Peripheral IV Care [RC] .AS DIRECTED Care  05/20/18 20:55 Active


 


 Peripheral IV Care [RC] QSHIFT Care  05/20/18 19:39 Active


 


 Up With Assistance [RC] DAILY Care  05/20/18 20:54 Active


 


 Vital Signs [RC] 0300,0700,1100,1500,1900,2300 Care  05/20/18 20:54 Active


 


 PT Evaluation and Treatment [CONS] Routine Cons  05/21/18 11:11 Active


 


 2 Gram Sodium Diet [DIET] Diet  05/21/18 Breakfast Active


 


 MISCELLANEOUS CULT [MREF] Routine Lab  05/20/18 20:50 Received


 


 TROPONIN I [CHEM] Routine Lab  05/21/18 11:40 Received


 


 Acetaminophen [Tylenol Arthritis Pain] Med  05/21/18 12:25 Ordered





 1,000 mg PO TID PRN   


 


 Acetaminophen [Tylenol Arthritis Pain] Med  05/20/18 23:18 Stop Req





 1,300 mg PO TID PRN   


 


 Albuterol [Ventolin HFA] Med  05/20/18 23:18 Stop Req





 0 gm INH Q4H PRN   


 


 Albuterol [Ventolin HFA] Med  05/21/18 12:25 Ordered





 1 gm INH Q4H PRN   


 


 Allopurinol [Zyloprim] Med  05/21/18 11:30 Active





 300 mg PO DAILY   


 


 Aspirin [Ecotrin] Med  05/21/18 09:00 Active





 325 mg PO DAILY   


 


 Atropine [Atropine 0.1 MG/ML] Med  05/20/18 23:29 Active





 0 mg IVPUSH ASDIRECTED PRN   


 


 EPINEPHrine [EPINEPHrine 1:10,000] Med  05/20/18 23:29 Active





 1 mg IVPUSH ASDIRECTED PRN   


 


 Furosemide [Lasix] Med  05/21/18 09:00 Active





 20 mg PO DAILY   


 


 Lidocaine 2% [Xylocaine 2%] Med  05/20/18 23:29 Active





 0 mg IVPUSH ASDIRECTED PRN   


 


 Lisinopril [Prinivil] Med  05/21/18 08:00 Active





 20 mg PO BIDMEALS   


 


 Metoprolol Tartrate [Lopressor] Med  05/21/18 09:00 Active





 25 mg PO BID   


 


 Nitroglycerin [Nitrostat] Med  05/20/18 23:29 Active





 0.4 mg SL ASDIRECTED PRN   


 


 Pantoprazole [ProTONIX***] Med  05/21/18 17:00 Active





 40 mg PO BID@0700,1700   


 


 Simethicone Med  05/21/18 12:00 Active





 120 mg PO TIDMEALS PRN   


 


 Sodium Chloride 0.9% [Syrex Flush] Med  05/20/18 20:54 Active





 5 ml FLUSH Q8HR PRN   


 


 Sodium Chloride 0.9% with KCl [Normal Saline with 40 Med  05/20/18 21:00 Active





 mEq KCl] 1,000 ml   





 IV ASDIRECTED   


 


 Tamsulosin [Flomax] Med  05/21/18 09:00 Active





 0.4 mg PO QAM   


 


 atorvaSTATin [Lipitor] Med  05/21/18 21:00 Active





 40 mg PO BEDTIME   


 


 busPIRone [Buspar] Med  05/21/18 08:00 Active





 10 mg PO BIDMEALS   


 


 chlordiazePOXIDE [Librium] Med  05/21/18 08:00 Active





 10 mg PO BIDMEALS   


 


 metFORMIN [Glucophage] Med  05/21/18 08:00 Active





 1,000 mg PO BIDM   


 


 traMADol [Ultram] Med  05/21/18 11:08 Active





 50 mg PO Q8H PRN   


 


 Peripheral IV Insertion Adult [OM.PC] Routine Oth  05/20/18 19:38 Ordered


 


 Peripheral IV Insertion Adult [OM.PC] Routine Oth  05/20/18 20:54 Ordered


 


 Resuscitation Status Routine Resus Stat  05/20/18 20:54 Ordered








 Medication Orders





Allopurinol (Zyloprim)  300 mg PO DAILY [facility]


   Last Admin: 05/21/18 12:16  Dose: 300 mg


Aspirin (Ecotrin)  325 mg PO DAILY [facility]


   Last Admin: 05/21/18 08:58  Dose: 325 mg


Atorvastatin Calcium (Lipitor)  40 mg PO BEDTIME [facility]


Atropine Sulfate (Atropine 0.1 Mg/Ml)  0 mg IVPUSH ASDIRECTED PRN


   PRN Reason: Heart


Buspirone HCl (Buspar)  10 mg PO BIDMEALS [facility]


   Last Admin: 05/21/18 09:20  Dose: 10 mg


Chlordiazepoxide HCl (Librium)  10 mg PO BIDMEALS [facility]


   Last Admin: 05/21/18 08:58  Dose: 10 mg


Epinephrine HCl (Epinephrine 1:10,000)  1 mg IVPUSH ASDIRECTED PRN


   PRN Reason: Heart


Furosemide (Lasix)  20 mg PO DAILY [facility]


   Last Admin: 05/21/18 08:59  Dose: 20 mg


Potassium Chloride/Sodium Chloride (Normal Saline With 40 Meq Kcl)  1,000 mls @ 

125 mls/hr IV ASDIRECTED [facility]


   Last Admin: 05/21/18 05:20  Dose: 125 mls/hr


   Infusion: 05/21/18 05:20  Dose: 125 mls/hr


   Admin: 05/20/18 21:35  Dose: 125 mls/hr


Lidocaine HCl (Xylocaine 2%)  0 mg IVPUSH ASDIRECTED PRN


   PRN Reason: Heart


Lisinopril (Prinivil)  20 mg PO BIDMEALS [facility]


   Last Admin: 05/21/18 09:00  Dose: 20 mg


Metformin HCl (Glucophage)  1,000 mg PO BIDM [facility]


   Last Admin: 05/21/18 08:58  Dose: 1,000 mg


Metoprolol Tartrate (Lopressor)  25 mg PO BID [facility]


   Last Admin: 05/21/18 09:00  Dose: 25 mg


Nitroglycerin (Nitrostat)  0.4 mg SL ASDIRECTED PRN


   PRN Reason: Heart


Pantoprazole Sodium (Protonix***)  40 mg PO BID@0700,1700 [facility]


Simethicone (Simethicone)  120 mg PO TIDMEALS PRN


   PRN Reason: Gas


Sodium Chloride (Syrex Flush)  5 ml FLUSH Q8HR PRN


   PRN Reason: Keep Vein Open


   Last Admin: 05/20/18 21:41  Dose: 5 ml


Tamsulosin HCl (Flomax)  0.4 mg PO QAM [facility]


   Last Admin: 05/21/18 08:58  Dose: 0.4 mg


Tramadol HCl (Ultram)  50 mg PO Q8H PRN


   PRN Reason: Pain (severe 7-10)








Assessment/Plan Comment:: 


HPI summary: Mr. Angel was reportedly in his usual state of health when he laid 

down for a nap at around 1300 on 5/20/18. His wife states he then awoke a 

couple hours later and noted he was difficult to understand so she called EMS, 

who states that there was obvious slurred speech, but no other focal 

neurological abnormalities. Total time of slurred speech was <60 minutes.





ED course: Upon arrival at the [facility] ED, his slurred speech had resolved 

and the patient denied any complaints. Exam was without abnormality. Work-up 

was notable for hypokalemia, evidence for mild dehydration with urinary 

specific gravity >1.030, normal troponin and EKG, and normal CT head. He was 

recommended to be admitted to observation for TIA.





Hospitalization problems and plan:


# TIA: Symptom onset and resolution prior to arrival without hemorrhagic CVA on 

CT head. No recurrence of slurred speech or other neurological deficits. No 

swallowing concerns. No prior TIA or CVA. ABCD2 score = 5, placing him at 

moderate risk for stroke within the next 90 days. Telemetry without arrhythmia 

overnight. Recent lipid panel wnl obtained in the clinic within the past month. 

A1c today 6.8%. 


# HTN: On lisinopril, metoprolol, and furosemide as outpatient. Elevated since 

admission, but with interval improvement. 


# Elevated troponin: 0.10 this morning, up from <0.04 on admission.


# Hypokalemia: 2.9 on admission, up to 3.7 this morning with supplementation 

overnight.


# Anemia: Hgb 12.9 on admission, which is at baseline. Normocytic. 


# Hematuria and proteinuria: New onset without prior abnormality noted on UAs.





- Continue neurological monitoring and telemetry


- Continue outpatient lisinopril, metoprolol, and furosmide in addition to 

close monitoring of BPs with current goal of slow reduction from admission BPs 

in the setting of TIA


- Increase to high dose statin with atorvastatin 40mg and discontinue 

gemfibrozil given concurrent use concerns


- Increase ASA to 325mg daily


- Discontinue IVF following 1L of NS with 40mEq KCl


- Repeat troponin in 4 hours


- CBC, BMP, magnesium, and UA tomorrow


- Physical therapy evaluation





Chronic, stable conditions:


# GERD: Stable. Continue PPI.


# BPH: Stable. Continue tamsulosin.


# DMT2: A1c 6.8% Continue metformin.


# HLD: Recent lipid panel wnl. See changes to regimen, as above. 


# Obesity: BMI 37. 


# JUNAID: Stable. Continue buspirone.


# Gout: Stable. Continue allopurinol.


# OA: Stable. Continue Tylenol and tramadol.





Hospitalization details:


# FEN: Stop IVF following remainder of 1L NS with 40mEq KCl. Electrolytes now 

normal. Heart healthy diet.


# PPX: Enoxaparin. 


# Code status: DNR/DNI.


# Emergency contact: Wife, who was updated at bedside this morning.


# Disposition: Admit to observation for management of above. Anticipate 

discharge to home tomorrow if clinically stable, as patient adamantly declines 

consideration for assisted or skilled nursing care.

## 2018-05-22 VITALS — SYSTOLIC BLOOD PRESSURE: 173 MMHG | DIASTOLIC BLOOD PRESSURE: 86 MMHG

## 2018-05-22 LAB
CHLORIDE SERPL-SCNC: 107 MMOL/L (ref 98–115)
SODIUM SERPL-SCNC: 146 MMOL/L (ref 136–145)

## 2018-05-22 RX ADMIN — ASPIRIN SCH MG: 325 TABLET, DELAYED RELEASE ORAL at 08:08

## 2018-05-26 NOTE — [id number]
**Discharge Summary





- Discharge Data


Discharge Date: 05/22/18


Discharge Disposition: Home, Self-Care 01


Condition: Good





- Patient Summary/Data


Consults: 


 Consultations





05/21/18 11:11


PT Evaluation and Treatment [CONS] Routine 














- Discharge Plan


Prescriptions/Med Rec: 


Aspirin [Ecotrin] 325 mg PO DAILY #90 tablet.


atorvaSTATin [Lipitor] 40 mg PO BEDTIME #30 tab


Capsaicin [Arthritis Pain Relief] 42.5 gm TP TID #1 cream..g.


Home Medications: 


 Home Meds





Albuterol [Ventolin HFA] 1 inhaler INH Q4H PRN #1 inhaler 05/05/16 [Rx]


Furosemide [Lasix] 20 mg PO DAILY #90 tablet 05/05/16 [Rx]


Tamsulosin [Flomax] 0.4 mg PO QAM #90 cap.er 05/05/16 [Rx]


Lisinopril 20 mg PO BIDMEALS 01/30/17 [History]


Metoprolol Tartrate [Lopressor] 25 mg PO BID 01/30/17 [History]


Simethicone [Gas-X] 125 mg PO TIDMEALS PRN 12/06/17 [History]


busPIRone [Buspar] 10 mg PO BIDMEALS 12/06/17 [History]


chlordiazePOXIDE [Librium] 10 mg PO BIDMEALS 12/06/17 [History]


Acetaminophen [Tylenol Arthritis] 1,300 mg PO TID PRN 05/20/18 [History]


Allopurinol [Zyloprim] 300 mg PO DAILY 05/21/18 [History]


Esomeprazole [NexIUM] 40 mg PO ACBREAKFAST 05/21/18 [History]


metFORMIN HCl [Metformin HCl] 1,000 mg PO BIDMEALS 05/21/18 [History]


Aspirin [Ecotrin] 325 mg PO DAILY #90 tablet. 05/22/18 [Rx]


Capsaicin [Arthritis Pain Relief] 42.5 gm TP TID #1 cream..g. 05/22/18 [Rx]


atorvaSTATin [Lipitor] 40 mg PO BEDTIME #30 tab 05/22/18 [Rx]








Referrals: 


Mark Esquivel, NP [Nurse Practitioner] - 


(anytime next week.





Also, inform  that his spouse needs to have appointment same day 

with me.  )





- Discharge Summary/Plan Comment


DC Time >30 min.: Yes


Discharge Summary/Plan Comment: 





History:  78 patient admitted into [facility] statust through the ED when his wife 

stated he then awoke from a nap and noted he was difficult to understand so she 

called EMS, who states that there was obvious slurred speech, but no other 

focal neurological abnormalities. Total time of slurred speech was <60 minutes.





ED course: Upon arrival at the [facility] ED, his slurred speech had resolved 

and the patient denied any complaints. Exam was without abnormality. Work-up 

was notable for hypokalemia, evidence for mild dehydration with urinary 

specific gravity >1.030, normal troponin and EKG, and normal CT head. He was 

recommended to be admitted to observation for TIA.





Final DX:  


TIA


Question cardiac demand as troponin initial spike 0.10








Hospital Course:  


Patient did well without any further signs/sx of TIA or deficits, he demanded 

to be released from hospital the next morning on rounds.  No swallowing 

concerns. No prior TIA or CVA. ABCD2 score = 5, placing him at moderate risk 

for stroke within the next 90 days. Telemetry without arrhythmia overnight. 

Recent lipid panel wnl obtained in the clinic within the past month. A1c 6.8%. 

He did have questionable cardiac demand as troponin initial spike 0.10 without 

sx.  Mild Hypokalemia on admission with normalization after correction.  HTN 

since admission, but with interval improvement. He was kept on neurological 

monitoring and telemetry, lisinopril, metoprolol, and furosmide in addition to 

close monitoring of BPs with current goal of slow reduction from admission BPs 

in the setting of TIA.


He refused consideration for assisted or skilled nursing care.





Med adjustments/changes


ASA 325mg daily


DC'd gemfibrozole


ADDED high dose Atorvastatin





Considerations at f/u:  Plan for outpatient carotid ultrasound and 

echocardiogram with bubble study to further assess for etiology of TIA. If no 

etiology noted, consider extended rhythm monitoring to further assess for 

paroxysmal atrial fibrillation as etiology. ABCD2 score = 5, placing him at 

moderate risk for stroke within the next 90 days post DC. Report to ED if any 

concerns of any nature, i.e. stroke like sx, slurred speech, weakness, 

confusion. 





- General Info


Functional Status: Denies: Pain Controlled (knee pain on ambulation)





- Review of Systems


General: Reports: No Symptoms


HEENT: Reports: No Symptoms


Pulmonary: Reports: No Symptoms


Cardiovascular: Reports: No Symptoms


Gastrointestinal: Reports: No Symptoms


Neurological: Reports: No Symptoms.  Denies: Confusion, Dizziness, Headache, 

Numbness, Paresthesia, Seizure, Syncope, Tremors, Weakness, Change in Speech


Psychiatric: Reports: No Symptoms





- Patient Data


Vitals - Most Recent: 


 Last Vital Signs











Temp  97.4 F   05/22/18 06:34


 


Pulse  72   05/22/18 08:19


 


Resp  18   05/22/18 06:34


 


BP  173/86 H  05/22/18 08:19


 


Pulse Ox  96   05/22/18 06:40











Weight - Most Recent: 278 lb 6.4 oz


GABY Results - Last 24 hrs: 


 Microbiology











 05/20/18 20:50 Miscellaneous Reference Culture - Final





 Wound - Arm, Right Gram Stain - Final











Med Orders - Current: 


 Current Medications








Discontinued Medications





Acetaminophen (Tylenol Arthritis Pain)  1,300 mg PO TID PRN


   PRN Reason: Pain (mild 1-3)


Acetaminophen (Tylenol Extra Strength)  1,000 mg PO TID PRN


   PRN Reason: Pain (mild 1-3)


Hydrocodone Bitart/Acetaminophen (Norco 325-5 Mg)  1 tab PO Q4H PRN


   PRN Reason: Pain


   Last Admin: 05/21/18 09:28 Dose:  1 tab


Albuterol (Ventolin Hfa)  0 gm INH Q4H PRN


   PRN Reason: Dyspnea


Albuterol (Ventolin Hfa)  0 gm INH Q4H PRN


   PRN Reason: Dyspnea


Allopurinol (Zyloprim)  300 mg PO DAILY [facility]


   Last Admin: 05/22/18 08:09 Dose:  300 mg


Aspirin (Aspirin)  324 mg PO ONETIME ONE


   Stop: 05/20/18 20:24


   Last Admin: 05/20/18 20:28 Dose:  324 mg


Aspirin (Ecotrin)  325 mg PO DAILY [facility]


   Last Admin: 05/22/18 08:08 Dose:  325 mg


Atorvastatin Calcium (Lipitor)  40 mg PO BEDTIME [facility]


   Last Admin: 05/21/18 21:10 Dose:  40 mg


Atropine Sulfate (Atropine 0.1 Mg/Ml)  0 mg IVPUSH ASDIRECTED PRN


   PRN Reason: Heart


Buspirone HCl (Buspar)  10 mg PO BIDMEALS [facility]


   Last Admin: 05/22/18 08:07 Dose:  10 mg


Chlordiazepoxide HCl (Librium)  10 mg PO BIDMEALS [facility]


   Last Admin: 05/22/18 08:14 Dose:  10 mg


Epinephrine HCl (Epinephrine 1:10,000)  1 mg IVPUSH ASDIRECTED PRN


   PRN Reason: Heart


Esomeprazole Magnesium (Nexium)  40 mg PO BID@0700,1700 [facility]


   Last Admin: 05/21/18 06:44 Dose:  Not Given


Furosemide (Lasix)  20 mg PO DAILY [facility]


   Last Admin: 05/22/18 08:08 Dose:  20 mg


Potassium Chloride/Sodium Chloride (Normal Saline With 40 Meq Kcl)  1,000 mls @ 

125 mls/hr IV ASDIRECTED [facility]


   Last Admin: 05/21/18 05:20 Dose:  125 mls/hr


Lidocaine HCl (Xylocaine 2%)  0 mg IVPUSH ASDIRECTED PRN


   PRN Reason: Heart


Lisinopril (Prinivil)  20 mg PO BIDMEALS [facility]


   Last Admin: 05/22/18 06:33 Dose:  20 mg


Metformin HCl (Glucophage)  1,000 mg PO BIDM [facility]


   Last Admin: 05/22/18 08:07 Dose:  1,000 mg


Metoprolol Tartrate (Lopressor)  25 mg PO BID [facility]


   Last Admin: 05/22/18 08:08 Dose:  25 mg


Nitroglycerin (Nitrostat)  0.4 mg SL ASDIRECTED PRN


   PRN Reason: Heart


Pantoprazole Sodium (Protonix***)  40 mg PO BID@0700,1700 [facility]


Pantoprazole Sodium (Protonix***)  40 mg PO ACBREAKFAST [facility]


   Last Admin: 05/22/18 08:07 Dose:  40 mg


Simethicone (Simethicone)  120 mg PO TIDMEALS PRN


   PRN Reason: Gas


Sodium Chloride (Syrex Flush)  5 ml FLUSH Q8HR PRN


   PRN Reason: Keep Vein Open


Sodium Chloride (Syrex Flush)  5 ml FLUSH Q8HR PRN


   PRN Reason: Keep Vein Open


   Last Admin: 05/20/18 21:41 Dose:  5 ml


Tamsulosin HCl (Flomax)  0.4 mg PO QAM [facility]


   Last Admin: 05/22/18 08:08 Dose:  0.4 mg


Tramadol HCl (Ultram)  50 mg PO Q8H PRN


   PRN Reason: Pain (severe 7-10)











- Exam


Quality Assessment: Denies: Supplemental Oxygen


General: Reports: Alert, Oriented, Cooperative, No Acute Distress


HEENT: Reports: Pupils Reactive


Lungs: Reports: Clear to Auscultation


Cardiovascular: Reports: Regular Rate, Regular Rhythm


Neurological: Reports: No New Focal Deficit, Normal Tone, Sensation Intact


Psy/Mental Status: Reports: Alert, Normal Affect, Normal Mood

## 2019-01-10 ENCOUNTER — HOSPITAL ENCOUNTER (INPATIENT)
Dept: HOSPITAL 77 - KA.MS | Age: 79
LOS: 3 days | Discharge: HOME | DRG: 682 | End: 2019-01-13
Attending: FAMILY MEDICINE | Admitting: NURSE PRACTITIONER
Payer: MEDICARE

## 2019-01-10 DIAGNOSIS — I11.0: ICD-10-CM

## 2019-01-10 DIAGNOSIS — Z88.8: ICD-10-CM

## 2019-01-10 DIAGNOSIS — R00.1: ICD-10-CM

## 2019-01-10 DIAGNOSIS — Z74.09: ICD-10-CM

## 2019-01-10 DIAGNOSIS — E11.9: ICD-10-CM

## 2019-01-10 DIAGNOSIS — R39.14: ICD-10-CM

## 2019-01-10 DIAGNOSIS — E66.01: ICD-10-CM

## 2019-01-10 DIAGNOSIS — K21.9: ICD-10-CM

## 2019-01-10 DIAGNOSIS — L30.4: ICD-10-CM

## 2019-01-10 DIAGNOSIS — E83.42: ICD-10-CM

## 2019-01-10 DIAGNOSIS — F03.90: ICD-10-CM

## 2019-01-10 DIAGNOSIS — Z79.899: ICD-10-CM

## 2019-01-10 DIAGNOSIS — I48.92: ICD-10-CM

## 2019-01-10 DIAGNOSIS — I50.33: ICD-10-CM

## 2019-01-10 DIAGNOSIS — N40.1: ICD-10-CM

## 2019-01-10 DIAGNOSIS — R53.81: ICD-10-CM

## 2019-01-10 DIAGNOSIS — F41.9: ICD-10-CM

## 2019-01-10 DIAGNOSIS — N17.9: Primary | ICD-10-CM

## 2019-01-10 DIAGNOSIS — R60.9: ICD-10-CM

## 2019-01-10 DIAGNOSIS — M17.10: ICD-10-CM

## 2019-01-10 DIAGNOSIS — M62.81: ICD-10-CM

## 2019-01-10 DIAGNOSIS — Z91.81: ICD-10-CM

## 2019-01-10 DIAGNOSIS — E87.70: ICD-10-CM

## 2019-01-10 LAB
ANION GAP SERPL CALC-SCNC: 16.6 MMOL/L (ref 5–15)
CHLORIDE SERPL-SCNC: 108 MMOL/L (ref 98–115)
SODIUM SERPL-SCNC: 143 MMOL/L (ref 136–145)

## 2019-01-10 RX ADMIN — NYSTATIN SCH APPLIC: 100000 CREAM TOPICAL at 21:39

## 2019-01-10 RX ADMIN — ACETAMINOPHEN SCH MG: 650 TABLET, FILM COATED, EXTENDED RELEASE ORAL at 16:01

## 2019-01-10 RX ADMIN — ACETAMINOPHEN SCH MG: 650 TABLET, FILM COATED, EXTENDED RELEASE ORAL at 21:37

## 2019-01-10 RX ADMIN — NYSTATIN SCH APPLIC: 100000 CREAM TOPICAL at 18:44

## 2019-01-11 LAB
ANION GAP SERPL CALC-SCNC: 17.4 MMOL/L (ref 5–15)
CHLORIDE SERPL-SCNC: 104 MMOL/L (ref 98–115)
SODIUM SERPL-SCNC: 142 MMOL/L (ref 136–145)

## 2019-01-11 RX ADMIN — ACETAMINOPHEN SCH MG: 650 TABLET, FILM COATED, EXTENDED RELEASE ORAL at 06:48

## 2019-01-11 RX ADMIN — NYSTATIN SCH APPLIC: 100000 CREAM TOPICAL at 21:02

## 2019-01-11 RX ADMIN — ACETAMINOPHEN SCH MG: 650 TABLET, FILM COATED, EXTENDED RELEASE ORAL at 21:01

## 2019-01-11 RX ADMIN — ACETAMINOPHEN SCH MG: 650 TABLET, FILM COATED, EXTENDED RELEASE ORAL at 14:40

## 2019-01-11 RX ADMIN — NYSTATIN SCH APPLIC: 100000 CREAM TOPICAL at 09:34

## 2019-01-11 NOTE — PCM.PN
- General Info


Date of Service: 01/11/19


Subjective Update: 


Mr. Angel voices no symptomatic complaints today. States the swelling in his 

legs is about the same. Does not wear compression stockings at home due to "not 

wanting to." Continues to deny lightheadedness, dizziness, headache, chest pain

, shortness of breath, or other complaints. Tolerating diet well. 





Wife denies complaints. 





Nursing states he has refused compression stockings and wraps. Continues with 

intermittently cantankerous affect toward staff.





- Patient Data


Vitals - Most Recent: 


 Last Vital Signs











Temp  37.1 C   01/11/19 22:37


 


Pulse  52 L  01/11/19 22:37


 


Resp  20   01/11/19 22:37


 


BP  125/62   01/11/19 22:37


 


Pulse Ox  92 L  01/11/19 22:37











Weight - Most Recent: 136.531 kg


I&O - Last 24 Hours: 


 Intake & Output











 01/11/19 01/11/19 01/12/19





 14:59 22:59 06:59


 


Intake Total 600 900 


 


Output Total 1150 1150 


 


Balance -550 -250 











Lab Results Last 24 Hours: 


 Laboratory Results - last 24 hr











  01/11/19 01/11/19 01/11/19 Range/Units





  07:10 07:10 07:10 


 


WBC     (5.00-10.00)  10^3/uL


 


RBC     (4.50-6.00)  10^6/uL


 


Hgb     (13.0-17.0)  g/dL


 


Hct     (40.0-52.0)  %


 


MCV     (82.0-92.0)  fL


 


MCH     (27.0-31.0)  pg


 


MCHC     (32.0-36.0)  g/dL


 


RDW     (11.5-14.5)  %


 


Plt Count     (150-400)  10^3/uL


 


MPV     (7.4-10.4)  fL


 


Immature Gran % (Auto)     (0.0-5.0)  %


 


Neut % (Auto)     (50.0-70.0)  %


 


Lymph % (Auto)     (20.0-40.0)  %


 


Mono % (Auto)     (2.0-8.0)  %


 


Eos % (Auto)     (1.0-3.0)  %


 


Baso % (Auto)     (0.0-1.0)  %


 


Immature Gran # (Auto)     (0.00-0.50)  10^3/uL


 


Neut # (Auto)     (2.50-7.00)  10^3/uL


 


Lymph # (Auto)     (1.00-4.00)  10^3/uL


 


Mono # (Auto)     (0.10-0.80)  10^3/uL


 


Eos # (Auto)     (0.10-0.30)  10^3/uL


 


Baso # (Auto)     (0.00-0.10)  10^3/uL


 


PT   11.1   (8.9-11.4)  SEC


 


INR   1.1   (0.9-1.1)  


 


Sodium  142    (136-145)  mmol/L


 


Potassium  4.2    (3.3-5.3)  mmol/L


 


Chloride  104    ()  mmol/L


 


Carbon Dioxide  24.8    (21.0-32.0)  mmol/L


 


Anion Gap  17.4 H    (5-15)  mmol/L


 


BUN  55 H*    (6-25)  mg/dL


 


Creatinine  1.55 H    (0.51-1.17)  mg/dL


 


Est Cr Clr Drug Dosing  42.42    mL/min


 


Estimated GFR (MDRD)  43    mL/min


 


Glucose  122 H   (75 - 99) mg/dL


 


POC Glucose     ()  mg/dl


 


Calcium  8.6 L    (8.7-10.3)  mg/dL


 


Magnesium    1.5 L  (1.8-2.4)  mg/dL


 


Troponin I    < 0.04  (0.00-0.070)  ng/mL


 


B-Natriuretic Peptide    89  (0-100)  pg/mL














  01/11/19 01/11/19 Range/Units





  07:10 07:24 


 


WBC  6.08   (5.00-10.00)  10^3/uL


 


RBC  4.35 L   (4.50-6.00)  10^6/uL


 


Hgb  13.0   (13.0-17.0)  g/dL


 


Hct  39.8 L   (40.0-52.0)  %


 


MCV  91.5  D   (82.0-92.0)  fL


 


MCH  29.9   (27.0-31.0)  pg


 


MCHC  32.7   (32.0-36.0)  g/dL


 


RDW  15.7 H   (11.5-14.5)  %


 


Plt Count  113 L   (150-400)  10^3/uL


 


MPV  11.4 H   (7.4-10.4)  fL


 


Immature Gran % (Auto)  0.3   (0.0-5.0)  %


 


Neut % (Auto)  64.0   (50.0-70.0)  %


 


Lymph % (Auto)  26.8   (20.0-40.0)  %


 


Mono % (Auto)  8.4 H   (2.0-8.0)  %


 


Eos % (Auto)  0.2 L   (1.0-3.0)  %


 


Baso % (Auto)  0.3   (0.0-1.0)  %


 


Immature Gran # (Auto)  0.02   (0.00-0.50)  10^3/uL


 


Neut # (Auto)  3.89   (2.50-7.00)  10^3/uL


 


Lymph # (Auto)  1.63   (1.00-4.00)  10^3/uL


 


Mono # (Auto)  0.51   (0.10-0.80)  10^3/uL


 


Eos # (Auto)  0.01 L   (0.10-0.30)  10^3/uL


 


Baso # (Auto)  0.02   (0.00-0.10)  10^3/uL


 


PT    (8.9-11.4)  SEC


 


INR    (0.9-1.1)  


 


Sodium    (136-145)  mmol/L


 


Potassium    (3.3-5.3)  mmol/L


 


Chloride    ()  mmol/L


 


Carbon Dioxide    (21.0-32.0)  mmol/L


 


Anion Gap    (5-15)  mmol/L


 


BUN    (6-25)  mg/dL


 


Creatinine    (0.51-1.17)  mg/dL


 


Est Cr Clr Drug Dosing    mL/min


 


Estimated GFR (MDRD)    mL/min


 


Glucose   (75 - 99) mg/dL


 


POC Glucose   129 H  ()  mg/dl


 


Calcium    (8.7-10.3)  mg/dL


 


Magnesium    (1.8-2.4)  mg/dL


 


Troponin I    (0.00-0.070)  ng/mL


 


B-Natriuretic Peptide    (0-100)  pg/mL











Med Orders - Current: 


 Current Medications





Acetaminophen (Tylenol Arthritis Pain)  650 mg PO Q8HR [facility]


   Last Admin: 01/11/19 21:01 Dose:  650 mg


Allopurinol (Zyloprim)  300 mg PO DAILY [facility]


   Last Admin: 01/11/19 08:23 Dose:  300 mg


Atorvastatin Calcium (Lipitor)  40 mg PO BEDTIME [facility]


   Last Admin: 01/11/19 21:01 Dose:  40 mg


Atropine Sulfate (Atropine 0.1 Mg/Ml)  0 mg IVPUSH ASDIRECTED PRN


   PRN Reason: Heart


Buspirone HCl (Buspar)  10 mg PO BIDMEALS [facility]


   Last Admin: 01/11/19 18:05 Dose:  10 mg


Chlordiazepoxide HCl (Librium)  10 mg PO BIDMEALS [facility]


   Last Admin: 01/11/19 18:05 Dose:  10 mg


Enoxaparin Sodium (Lovenox)  40 mg SUBCUT Q24H [facility]


   Last Admin: 01/11/19 16:39 Dose:  40 mg


Epinephrine HCl (Epinephrine 1:10,000)  1 mg IVPUSH ASDIRECTED PRN


   PRN Reason: Heart


Lidocaine HCl (Xylocaine 2%)  0 mg IVPUSH ASDIRECTED PRN


   PRN Reason: Heart


Lisinopril (Prinivil)  20 mg PO DAILY [facility]


Magnesium Oxide (Magnesium Oxide)  500 mg PO DAILY@1800 [facility]


   Last Admin: 01/11/19 18:05 Dose:  500 mg


Nitroglycerin (Nitrostat)  0.4 mg SL ASDIRECTED PRN


   PRN Reason: Heart


Nystatin (Nystatin Crm)  1 gm TOP BID [facility]


   Last Admin: 01/11/19 21:02 Dose:  1 applic


Pantoprazole Sodium (Protonix***)  40 mg PO ACBREAKFAST [facility]


   Last Admin: 01/11/19 06:47 Dose:  40 mg


Terazosin HCl (Hytrin)  5 mg PO DAILY [facility]


   Last Admin: 01/11/19 09:31 Dose:  5 mg


Tramadol HCl (Ultram)  50 mg PO BID PRN


   PRN Reason: Pain (moderate 4-6)


Warfarin Sodium (Coumadin)  5 mg PO DAILY@1800 [facility]


   Last Admin: 01/11/19 18:05 Dose:  5 mg


Warfarin Sodium (Pharmacy To Dose - Warfarin)  1 dose .XX ASDIRECTED [facility]





Discontinued Medications





Furosemide (Lasix)  40 mg IVPUSH BIDDIURETIC [facility]


   Last Admin: 01/10/19 18:04 Dose:  Not Given


Lisinopril (Prinivil)  20 mg PO BIDMEALS [facility]


Metformin HCl (Glucophage)  1,000 mg PO BIDMEALS [facility]


Metolazone (Zaroxolyn)  2.5 mg PO DAILY [facility]


Metolazone (Zaroxolyn)  2.5 mg PO DAILY [facility]


Metoprolol Tartrate (Lopressor)  25 mg PO BID@0700,1800 [facility]


   Last Admin: 01/11/19 06:48 Dose:  Not Given


Tamsulosin HCl (Flomax)  0.4 mg PO QAM [facility]











- Exam


Physical Findings Comments:: 


GENERAL: Elderly white male in no acute distress.


HEENT: Normocephalic, atraumatic.  Conjunctiva clear.  Nares patent without 

discharge.  Mucous membranes moiste.


NECK: Supple, no masses.


CV: Bradycardia, irregular rhythm, no murmurs, rubs, or gallops.  2+ radial 

pulses.


PULMONARY: Normal effort, clear to auscultation bilaterally, no wheezes, rales, 

or rhonchi.


ABDOMEN: Positive bowel sounds, soft, nontender, nondistended.


EXTREMITIES: 2+ edema of bilateral lower extremities to mid shin, no cyanosis 

or clubbing.


MUSCULOSKELETAL: Moves all extremities well.


NEUROLOGICAL: No obvious deficits.


PSYCHIATRIC: Alert, interactive, cantankerous affect.





- Problem List Review


Problem List Initiated/Reviewed/Updated: Yes





- My Orders


Last 24 Hours: 


My Active Orders





01/11/19 09:15


EKG 12 Lead [EK] Routine 





01/11/19 11:06


traMADol [Ultram]   50 mg PO BID PRN 





01/11/19 12:30


Pharmacy to Dose - Warfarin   1 dose .XX ASDIRECTED 





01/11/19 15:30


Enoxaparin [Lovenox]   40 mg SUBCUT Q24H 





01/11/19 18:00


Magnesium Oxide   500 mg PO DAILY@1800 





01/12/19 05:11


CBC WITH AUTO DIFF [HEME] AM 


COMPREHENSIVE METABOLIC PN,CMP [CHEM] AM 


MAGNESIUM [CHEM] AM 


TSH ULTRASENSITIVE [CHEM] AM 





01/12/19 06:00


INR,PT,PROTHROMBIN TIME [COAG] DAILY 





01/12/19 09:00


Lisinopril [Prinivil]   20 mg PO DAILY 





01/13/19 06:00


INR,PT,PROTHROMBIN TIME [COAG] DAILY 














- Plan


Plan:: 


HPI summary: 78yoM with history of HFpEF, chronic lower extremity edema, and 

significant self-care deficit, who presented to the [facility] on 1/10

/18 for follow-up of fluid status when he was noted to have ongoing significant 

edema and increase in creatinine to >2. He had been given an increased dose of 

furosemide and metolazone in the days prior. He had refused admission in the 

days prior, but that day agreed.





Hospitalization problems:


# Acute kidney injury: Baseline Cr 0.8. Admission Cr 2.12.


# HFpEF: 6/2018 echo with EF 50% and grade 1 diastolic dysfunction.


# HTN


# Lower extremity edema


# Atrial flutter


# Bradycardia


# Hypomagnesemia


# Intertrigo


# Cognitive impairment/dementia: Prior MOCA = 18.


# Deconditioning


# Self care deficit





Cr improving. Edema improving, though he has been refusing compression 

stockings or wraps. No evidence of heart failure, with normal BNP and no signs 

cardiopulmonary overload. Called cardiology, who reviewed EKGs as atrial 

flutter with bradycardia. He continues to be asymptomatic and cardiology 

recommends anticoagulation and consideration of consultation as outpatient. Mg 

noted to be low. Warfarin initiated on admission due to elevated [id number]. 

Again encouraged consideration of physical therapy and skilled nursing facility 

at discharge, to which he and wife both adamantly decline.





- Continue close VS monitoring, including telemetry


- Attempt placement of compression stockings or ACE wraps to lower extremities 

and encourage elevation while seated or in bed


- Replace magnesium


- Continue warfarin (chosen due to cost of DOACs) without bridging


- Continue holding metoprolol, lisinopril, amlodipine, furosemide and metolazone


- Continue nystatin


- Recheck CBC, CMP, Mg tomorrow along with TSH





Chronic, stable conditions:


# Hx TIA/HLD: Atorvastatin 40mg. ASA discontinued given initiation of 

anticoagulation.


# GERD/gastritis: PPI.


# DMT2: Holding metformin due to MIYA. Last A1c 6.9%.


# BPH: Terazosin 5mg daily. Holding tamsulosin due to MIYA.


# OA, knees: Tylenol 650mg TID prn, tramadol 50mg BID prn.


# Gout: Allopurinol 300mg.


# Anxiety: Buspirone 10mg BID, chlordiazepoxide 10mg BID. Suboptimal regimen, 

but has been on for decades.





Hospitalization details:


# FEN: No IVF. Electrolytes normal except as above. Diabetic diet.


# PPX: Enoxaparin for DVT ppx while awaiting therapeutic INR.


# Code status: DNR/DNI, confirmed with patient and wife.


# Emergency contact: Wife, Renetta, updated at bedside on rounds.


# Disposition: Continue on inpatient status. Ideally would agree to placement 

at SNF due to cognitive impairment/dementia and self care deficit in addition 

to chronic conditions, but he and wife continue to decline. Likely eventual 

discharge to home pending clinical status improvement.

## 2019-01-12 LAB
ANION GAP SERPL CALC-SCNC: 17.3 MMOL/L (ref 5–15)
CHLORIDE SERPL-SCNC: 105 MMOL/L (ref 98–115)
SODIUM SERPL-SCNC: 144 MMOL/L (ref 136–145)

## 2019-01-12 RX ADMIN — Medication PRN ML: at 21:39

## 2019-01-12 RX ADMIN — ACETAMINOPHEN SCH MG: 650 TABLET, FILM COATED, EXTENDED RELEASE ORAL at 15:56

## 2019-01-12 RX ADMIN — NYSTATIN SCH: 100000 CREAM TOPICAL at 22:16

## 2019-01-12 RX ADMIN — ACETAMINOPHEN SCH MG: 650 TABLET, FILM COATED, EXTENDED RELEASE ORAL at 21:38

## 2019-01-12 RX ADMIN — NYSTATIN SCH APPLIC: 100000 CREAM TOPICAL at 21:38

## 2019-01-12 RX ADMIN — ACETAMINOPHEN SCH MG: 650 TABLET, FILM COATED, EXTENDED RELEASE ORAL at 05:20

## 2019-01-12 RX ADMIN — ACETAMINOPHEN SCH MG: 650 TABLET, FILM COATED, EXTENDED RELEASE ORAL at 21:51

## 2019-01-12 RX ADMIN — Medication PRN ML: at 06:00

## 2019-01-12 NOTE — PCM.PN
- General Info


Date of Service: 01/12/19


Subjective Update: 


Mr. Angel again denies any symptomatic complaints today. States the swelling in 

his legs is about the same, but wife feels it is better. Agreed to wear ACE 

wraps yesterday. Showered again today. Continues to deny lightheadedness, 

dizziness, headache, chest pain, shortness of breath, or other complaints. 

Tolerating diet well. Ambulating without difficulty. Wife and he continue to 

decline consideration of home health or SNF placement at discharge. 





Continues with intermittently cantankerous affect toward staff, but otherwise 

no nursing complaints.





- Patient Data


Vitals - Most Recent: 


 Last Vital Signs











Temp  36.8 C   01/12/19 06:17


 


Pulse  65   01/12/19 06:17


 


Resp  20   01/12/19 06:17


 


BP  141/91 H  01/12/19 10:59


 


Pulse Ox  97   01/12/19 06:17











Weight - Most Recent: 136.531 kg


I&O - Last 24 Hours: 


 Intake & Output











 01/11/19 01/12/19 01/12/19





 22:59 06:59 14:59


 


Intake Total 900 300 


 


Output Total 1150 900 


 


Balance -250 -600 











Lab Results Last 24 Hours: 


 Laboratory Results - last 24 hr











  01/12/19 01/12/19 01/12/19 Range/Units





  06:12 07:27 07:27 


 


WBC     (5.00-10.00)  10^3/uL


 


RBC     (4.50-6.00)  10^6/uL


 


Hgb     (13.0-17.0)  g/dL


 


Hct     (40.0-52.0)  %


 


MCV     (82.0-92.0)  fL


 


MCH     (27.0-31.0)  pg


 


MCHC     (32.0-36.0)  g/dL


 


RDW     (11.5-14.5)  %


 


Plt Count     (150-400)  10^3/uL


 


MPV     (7.4-10.4)  fL


 


Immature Gran % (Auto)     (0.0-5.0)  %


 


Neut % (Auto)     (50.0-70.0)  %


 


Lymph % (Auto)     (20.0-40.0)  %


 


Mono % (Auto)     (2.0-8.0)  %


 


Eos % (Auto)     (1.0-3.0)  %


 


Baso % (Auto)     (0.0-1.0)  %


 


Immature Gran # (Auto)     (0.00-0.50)  10^3/uL


 


Neut # (Auto)     (2.50-7.00)  10^3/uL


 


Lymph # (Auto)     (1.00-4.00)  10^3/uL


 


Mono # (Auto)     (0.10-0.80)  10^3/uL


 


Eos # (Auto)     (0.10-0.30)  10^3/uL


 


Baso # (Auto)     (0.00-0.10)  10^3/uL


 


PT    11.3  (8.9-11.4)  SEC


 


INR    1.1  (0.9-1.1)  


 


Sodium   144   (136-145)  mmol/L


 


Potassium   3.9   (3.3-5.3)  mmol/L


 


Chloride   105   ()  mmol/L


 


Carbon Dioxide   25.6   (21.0-32.0)  mmol/L


 


Anion Gap   17.3 H   (5-15)  mmol/L


 


BUN   36 H   (6-25)  mg/dL


 


Creatinine   1.09   (0.51-1.17)  mg/dL


 


Est Cr Clr Drug Dosing   60.32   mL/min


 


Estimated GFR (MDRD)   > 60   mL/min


 


Glucose   140 H  (75 - 99) mg/dL


 


POC Glucose  153 H    ()  mg/dl


 


Calcium   8.7   (8.7-10.3)  mg/dL


 


Magnesium   1.8   (1.8-2.4)  mg/dL


 


Total Bilirubin   1.4 H   (0.2-1.0)  mg/dL


 


AST   55 H   (15-37)  U/L


 


ALT   59   (12-78)  U/L


 


Alkaline Phosphatase   93   ()  IU/L


 


Total Protein   7.2   (6.4-8.2)  g/dL


 


Albumin   3.52   (3.00-4.80)  g/dL


 


TSH, Ultra Sensitive   3.620   (0.340-4.820)  uIU/mL














  01/12/19 Range/Units





  07:27 


 


WBC  5.07  (5.00-10.00)  10^3/uL


 


RBC  4.29 L  (4.50-6.00)  10^6/uL


 


Hgb  12.9 L  (13.0-17.0)  g/dL


 


Hct  39.2 L  (40.0-52.0)  %


 


MCV  91.4  (82.0-92.0)  fL


 


MCH  30.1  (27.0-31.0)  pg


 


MCHC  32.9  (32.0-36.0)  g/dL


 


RDW  15.7 H  (11.5-14.5)  %


 


Plt Count  114 L  (150-400)  10^3/uL


 


MPV  10.5 H  (7.4-10.4)  fL


 


Immature Gran % (Auto)  0.4  (0.0-5.0)  %


 


Neut % (Auto)  63.9  (50.0-70.0)  %


 


Lymph % (Auto)  26.2  (20.0-40.0)  %


 


Mono % (Auto)  9.3 H  (2.0-8.0)  %


 


Eos % (Auto)  0.0 L  (1.0-3.0)  %


 


Baso % (Auto)  0.2  (0.0-1.0)  %


 


Immature Gran # (Auto)  0.02  (0.00-0.50)  10^3/uL


 


Neut # (Auto)  3.24  (2.50-7.00)  10^3/uL


 


Lymph # (Auto)  1.33  (1.00-4.00)  10^3/uL


 


Mono # (Auto)  0.47  (0.10-0.80)  10^3/uL


 


Eos # (Auto)  0.00 L  (0.10-0.30)  10^3/uL


 


Baso # (Auto)  0.01  (0.00-0.10)  10^3/uL


 


PT   (8.9-11.4)  SEC


 


INR   (0.9-1.1)  


 


Sodium   (136-145)  mmol/L


 


Potassium   (3.3-5.3)  mmol/L


 


Chloride   ()  mmol/L


 


Carbon Dioxide   (21.0-32.0)  mmol/L


 


Anion Gap   (5-15)  mmol/L


 


BUN   (6-25)  mg/dL


 


Creatinine   (0.51-1.17)  mg/dL


 


Est Cr Clr Drug Dosing   mL/min


 


Estimated GFR (MDRD)   mL/min


 


Glucose  (75 - 99) mg/dL


 


POC Glucose   ()  mg/dl


 


Calcium   (8.7-10.3)  mg/dL


 


Magnesium   (1.8-2.4)  mg/dL


 


Total Bilirubin   (0.2-1.0)  mg/dL


 


AST   (15-37)  U/L


 


ALT   (12-78)  U/L


 


Alkaline Phosphatase   ()  IU/L


 


Total Protein   (6.4-8.2)  g/dL


 


Albumin   (3.00-4.80)  g/dL


 


TSH, Ultra Sensitive   (0.340-4.820)  uIU/mL











Med Orders - Current: 


 Current Medications





Acetaminophen (Tylenol Arthritis Pain)  650 mg PO Q8HR [facility]


   Last Admin: 01/12/19 05:20 Dose:  650 mg


Allopurinol (Zyloprim)  300 mg PO DAILY [facility]


   Last Admin: 01/12/19 10:59 Dose:  300 mg


Atorvastatin Calcium (Lipitor)  40 mg PO BEDTIME [facility]


   Last Admin: 01/11/19 21:01 Dose:  40 mg


Atropine Sulfate (Atropine 0.1 Mg/Ml)  0 mg IVPUSH ASDIRECTED PRN


   PRN Reason: Heart


Buspirone HCl (Buspar)  10 mg PO BIDMEALS [facility]


   Last Admin: 01/12/19 07:58 Dose:  10 mg


Chlordiazepoxide HCl (Librium)  10 mg PO BIDMEALS [facility]


   Last Admin: 01/12/19 07:58 Dose:  10 mg


Enoxaparin Sodium (Lovenox)  40 mg SUBCUT Q24H [facility]


   Last Admin: 01/11/19 16:39 Dose:  40 mg


Epinephrine HCl (Epinephrine 1:10,000)  1 mg IVPUSH ASDIRECTED PRN


   PRN Reason: Heart


Lidocaine HCl (Xylocaine 2%)  0 mg IVPUSH ASDIRECTED PRN


   PRN Reason: Heart


Metformin HCl (Glucophage Xr)  1,000 mg PO DAILY [facility]


Nitroglycerin (Nitrostat)  0.4 mg SL ASDIRECTED PRN


   PRN Reason: Heart


Nystatin (Nystatin Crm)  1 gm TOP BID [facility]


   Last Admin: 01/11/19 21:02 Dose:  1 applic


Pantoprazole Sodium (Protonix***)  40 mg PO ACBREAKFAST [facility]


   Last Admin: 01/12/19 06:48 Dose:  40 mg


Sodium Chloride (Saline Flush)  10 ml FLUSH Q8HR PRN


   PRN Reason: keep vein open


   Last Admin: 01/12/19 06:00 Dose:  10 ml


Tamsulosin HCl (Flomax)  0.4 mg PO PCBREAKFAST [facility]


Terazosin HCl (Hytrin)  5 mg PO DAILY [facility]


   Last Admin: 01/12/19 10:59 Dose:  5 mg


Tramadol HCl (Ultram)  50 mg PO BID PRN


   PRN Reason: Pain (moderate 4-6)


Warfarin Sodium (Coumadin)  5 mg PO DAILY@1800 [facility]


   Last Admin: 01/11/19 18:05 Dose:  5 mg


Warfarin Sodium (Pharmacy To Dose - Warfarin)  1 dose .XX ASDIRECTED [facility]





Discontinued Medications





Furosemide (Lasix)  40 mg IVPUSH BIDDIURETIC [facility]


   Last Admin: 01/10/19 18:04 Dose:  Not Given


Lisinopril (Prinivil)  20 mg PO BIDMEALS [facility]


Lisinopril (Prinivil)  20 mg PO DAILY [facility]


Magnesium Oxide (Magnesium Oxide)  500 mg PO DAILY@1800 [facility]


   Last Admin: 01/11/19 18:05 Dose:  500 mg


Metformin HCl (Glucophage)  1,000 mg PO BIDMEALS [facility]


Metolazone (Zaroxolyn)  2.5 mg PO DAILY [facility]


Metolazone (Zaroxolyn)  2.5 mg PO DAILY [facility]


Metoprolol Tartrate (Lopressor)  25 mg PO BID@0700,1800 [facility]


   Last Admin: 01/11/19 06:48 Dose:  Not Given


Tamsulosin HCl (Flomax)  0.4 mg PO QAM [facility]











- Exam


Physical Findings Comments:: 


GENERAL: Elderly white male in no acute distress sitting in bedside chair.


HEENT: Normocephalic, atraumatic.  Conjunctiva clear.  Nares patent without 

discharge.  Mucous membranes moist.


NECK: Supple, no masses, no JVD.


CV: Bradycardia, irregular rhythm, no murmurs, rubs, or gallops.  2+ radial 

pulses.


PULMONARY: Normal effort, clear to auscultation bilaterally, no wheezes, rales, 

or rhonchi.


ABDOMEN: Positive bowel sounds, soft, nontender, nondistended.


EXTREMITIES: 2+ edema of bilateral lower extremities to mid shin with interval 

improvement since prior exam, no cyanosis or clubbing.


MUSCULOSKELETAL: Moves all extremities well.


NEUROLOGICAL: No obvious deficits.


PSYCHIATRIC: Alert, interactive, cantankerous affect, notably poor memory and 

intermittently confused.





- Problem List Review


Problem List Initiated/Reviewed/Updated: Yes





- My Orders


Last 24 Hours: 


My Active Orders





01/11/19 12:30


Pharmacy to Dose - Warfarin   1 dose .XX ASDIRECTED 





01/11/19 15:30


Enoxaparin [Lovenox]   40 mg SUBCUT Q24H 





01/12/19 06:47


Sodium Chloride 0.9% [Saline Flush]   10 ml FLUSH Q8HR PRN 





01/12/19 06:48


Saline Lock Insert [OM.PC] Routine 





01/13/19 05:11


BASIC METABOLIC PANEL,BMP [CHEM] AM 


MG [MAGNESIUM] [CHEM] AM 





01/13/19 06:00


INR,PT,PROTHROMBIN TIME [COAG] DAILY 





01/13/19 09:00


Tamsulosin [Flomax]   0.4 mg PO PCBREAKFAST 


metFORMIN [Glucophage XR]   1,000 mg PO DAILY 














- Plan


Plan:: 


HPI summary: 78yoM with history of HFpEF, chronic lower extremity edema, and 

significant self-care deficit, who presented to the [facility] on 1/10

/18 for follow-up of fluid status when he was noted to have ongoing significant 

edema and increase in creatinine to >2. He had been given an increased dose of 

furosemide and metolazone in the days prior. He had refused admission in the 

days prior, but that day agreed.





Hospitalization problems:


# Acute kidney injury: Baseline Cr 0.8. Admission Cr 2.12.


# HFpEF: 6/2018 echo with EF 50% and grade 1 diastolic dysfunction.


# HTN


# Lower extremity edema


# Atrial flutter


# Bradycardia


# Hypomagnesemia


# Intertrigo


# Cognitive impairment/dementia: Prior MOCA = 18.


# Deconditioning


# Self care deficit





Cr with ongoing improvement off offending medications and even with lack of 

diuretics, edema improving with use of ACE wraps. No evidence of 

cardiopulmonary overload. Mg improved. Warfarin initiated on admission due to 

elevated [id number]; INR not yet therapeutic. Nursing reports improving 

intertrigo. Again encouraged consideration of physical therapy and skilled 

nursing facility at discharge, to which he and wife both adamantly decline.





- Continue close VS monitoring, including telemetry


- Continue compression stockings or ACE wraps to lower extremities and 

encourage elevation while seated or in bed


- Continue warfarin (chosen due to cost of DOACs) without bridging


- Continue holding metoprolol, lisinopril, amlodipine, furosemide and metolazone


- Continue nystatin


- Recheck CMP and Mg tomorrow





Chronic, stable conditions:


# Hx TIA/HLD: Atorvastatin 40mg. ASA discontinued given initiation of 

anticoagulation.


# GERD/gastritis: PPI.


# DMT2: Resume metformin. Last A1c 6.9%.


# BPH: Terazosin 5mg daily. Resume tamsulosin 0.4mg daily.


# OA, knees: Tylenol 650mg TID prn, tramadol 50mg BID prn.


# Gout: Allopurinol 300mg.


# Anxiety: Buspirone 10mg BID, chlordiazepoxide 10mg BID. Suboptimal regimen, 

but has been on for decades.





Hospitalization details:


# FEN: No IVF. Electrolytes normal except as above. Diabetic diet.


# PPX: Enoxaparin for DVT ppx while awaiting therapeutic INR.


# Code status: DNR/DNI, confirmed with patient and wife.


# Emergency contact: Wife, Renetta, updated at bedside on rounds.


# Disposition: Continue on inpatient status. Ideally would agree to placement 

at SNF due to cognitive impairment/dementia and self care deficit in addition 

to chronic conditions, but he and wife continue to decline. Plan to discharge 

home tomorrow pending ongoing clinical status and laboratory improvement. Will 

need close medication management at discharge.

## 2019-01-13 VITALS — SYSTOLIC BLOOD PRESSURE: 124 MMHG | DIASTOLIC BLOOD PRESSURE: 64 MMHG

## 2019-01-13 LAB
ANION GAP SERPL CALC-SCNC: 15.2 MMOL/L (ref 5–15)
CHLORIDE SERPL-SCNC: 106 MMOL/L (ref 98–115)
SODIUM SERPL-SCNC: 143 MMOL/L (ref 136–145)

## 2019-01-13 RX ADMIN — NYSTATIN SCH APPLIC: 100000 CREAM TOPICAL at 08:44

## 2019-01-13 RX ADMIN — ACETAMINOPHEN SCH MG: 650 TABLET, FILM COATED, EXTENDED RELEASE ORAL at 06:11

## 2019-01-13 NOTE — PCM.DCSUM1
**Discharge Summary





- Discharge Data


Discharge Date: 01/14/19


Discharge Disposition: Home, Self-Care 01


Condition: Fair





- Patient Instructions


Diet: Heart Healthy Diet, Low Sodium


Activity: As Tolerated


Showering/Bathing, Other: Shower twice weekly





- Discharge Plan


*PRESCRIPTION DRUG MONITORING PROGRAM REVIEWED*: Yes


*COPY OF PRESCRIPTION DRUG MONITORING REPORT IN PATIENT ROGER: Yes


Prescriptions/Med Rec: 


Nystatin [Nystatin Crm] 1 gm TOP BID #1 tube


Warfarin [Coumadin] 5 mg PO DAILY@1800 #30 tablet


Home Medications: 


 Home Meds





busPIRone [Buspar] 10 mg PO BIDMEALS 12/06/17 [History]


chlordiazePOXIDE [Librium] 10 mg PO BIDMEALS 12/06/17 [History]


Acetaminophen [Tylenol Arthritis] 650 mg PO Q8H 05/20/18 [History]


Allopurinol [Zyloprim] 300 mg PO DAILY 05/21/18 [History]


Esomeprazole [NexIUM] 40 mg PO ACBREAKFAST 05/21/18 [History]


metFORMIN HCl [Metformin HCl] 1,000 mg PO BIDMEALS 05/21/18 [History]


Capsaicin [Arthritis Pain Relief] 1 applic TP TID PRN 01/10/19 [History]


Tamsulosin [Flomax] 0.4 mg PO QAM 01/10/19 [History]


Terazosin [Hytrin] 5 mg PO DAILY 01/10/19 [History]


atorvaSTATin [Lipitor] 40 mg PO BEDTIME 01/10/19 [History]


Lisinopril 20 mg PO DAILY #30 tab 01/13/19 [Rx]


Nystatin [Nystatin Crm] 1 gm TOP BID #1 tube 01/13/19 [Rx]


Warfarin [Coumadin] 5 mg PO DAILY@1800 #30 tablet 01/13/19 [Rx]








Referrals: 


Mark Esquivel, NP [Nurse Practitioner] - 01/21/19





- Discharge Summary/Plan Comment


DC Time >30 min.: Yes





- Patient Data


Vitals - Most Recent: 


 Last Vital Signs











Temp  36.5 C   01/13/19 06:20


 


Pulse  45 L  01/13/19 06:20


 


Resp  20   01/13/19 06:20


 


BP  124/64   01/13/19 08:45


 


Pulse Ox  96   01/13/19 06:20











Weight - Most Recent: 133.356 kg


I&O - Last 24 hours: 


 Intake & Output











 01/12/19 01/13/19 01/13/19





 22:59 06:59 14:59


 


Intake Total 350 100 


 


Output Total 400 750 


 


Balance -50 -650 











Lab Results - Last 24 hrs: 


 Laboratory Results - last 24 hr











  01/13/19 01/13/19 01/13/19 Range/Units





  06:47 07:18 07:18 


 


PT   11.7 H   (8.9-11.4)  SEC


 


INR   1.2 H   (0.9-1.1)  


 


Sodium    143  (136-145)  mmol/L


 


Potassium    3.9  (3.3-5.3)  mmol/L


 


Chloride    106  ()  mmol/L


 


Carbon Dioxide    25.7  (21.0-32.0)  mmol/L


 


Anion Gap    15.2 H  (5-15)  mmol/L


 


BUN    26 H  (6-25)  mg/dL


 


Creatinine    1.07  (0.51-1.17)  mg/dL


 


Est Cr Clr Drug Dosing    61.44  mL/min


 


Estimated GFR (MDRD)    > 60  mL/min


 


Glucose    148 H (75 - 99) mg/dL


 


POC Glucose  131 H    ()  mg/dl


 


Calcium    8.7  (8.7-10.3)  mg/dL


 


Magnesium    1.9  (1.8-2.4)  mg/dL











Med Orders - Current: 


 Current Medications





Acetaminophen (Tylenol Arthritis Pain)  650 mg PO Q8HR [facility]


   Last Admin: 01/13/19 06:11 Dose:  650 mg


Allopurinol (Zyloprim)  300 mg PO DAILY [facility]


   Last Admin: 01/13/19 08:44 Dose:  300 mg


Atorvastatin Calcium (Lipitor)  40 mg PO BEDTIME [facility]


   Last Admin: 01/12/19 21:38 Dose:  40 mg


Atropine Sulfate (Atropine 0.1 Mg/Ml)  0 mg IVPUSH ASDIRECTED PRN


   PRN Reason: Heart


Buspirone HCl (Buspar)  10 mg PO BIDMEALS [facility]


   Last Admin: 01/13/19 08:43 Dose:  10 mg


Chlordiazepoxide HCl (Librium)  10 mg PO BIDMEALS [facility]


   Last Admin: 01/13/19 10:36 Dose:  10 mg


Enoxaparin Sodium (Lovenox)  40 mg SUBCUT Q24H [facility]


   Last Admin: 01/12/19 15:57 Dose:  40 mg


Epinephrine HCl (Epinephrine 1:10,000)  1 mg IVPUSH ASDIRECTED PRN


   PRN Reason: Heart


Lidocaine HCl (Xylocaine 2%)  0 mg IVPUSH ASDIRECTED PRN


   PRN Reason: Heart


Metformin HCl (Glucophage Xr)  1,000 mg PO BIDMEALS [facility]


   Last Admin: 01/13/19 08:43 Dose:  1,000 mg


Nitroglycerin (Nitrostat)  0.4 mg SL ASDIRECTED PRN


   PRN Reason: Heart


Nystatin (Nystatin Crm)  1 gm TOP BID [facility]


   Last Admin: 01/13/19 08:44 Dose:  1 applic


Pantoprazole Sodium (Protonix***)  40 mg PO ACBREAKFAST [facility]


   Last Admin: 01/13/19 07:17 Dose:  40 mg


Sodium Chloride (Saline Flush)  10 ml FLUSH Q8HR PRN


   PRN Reason: keep vein open


   Last Admin: 01/12/19 21:39 Dose:  10 ml


Tamsulosin HCl (Flomax)  0.4 mg PO PCBREAKFAST [facility]


   Last Admin: 01/13/19 08:43 Dose:  0.4 mg


Terazosin HCl (Hytrin)  5 mg PO DAILY [facility]


   Last Admin: 01/13/19 08:45 Dose:  5 mg


Tramadol HCl (Ultram)  50 mg PO BID PRN


   PRN Reason: Pain (moderate 4-6)


Warfarin Sodium (Coumadin)  5 mg PO DAILY@1800 [facility]


   Last Admin: 01/12/19 18:02 Dose:  5 mg


Warfarin Sodium (Pharmacy To Dose - Warfarin)  1 dose .XX ASDIRECTED [facility]





Discontinued Medications





Furosemide (Lasix)  40 mg IVPUSH BIDDIURETIC [facility]


   Last Admin: 01/10/19 18:04 Dose:  Not Given


Lisinopril (Prinivil)  20 mg PO BIDMEALS [facility]


Lisinopril (Prinivil)  20 mg PO DAILY [facility]


Magnesium Oxide (Magnesium Oxide)  500 mg PO DAILY@1800 [facility]


   Last Admin: 01/11/19 18:05 Dose:  500 mg


Metformin HCl (Glucophage)  1,000 mg PO BIDMEALS [facility]


Metformin HCl (Glucophage Xr)  1,000 mg PO DAILY [facility]


Metolazone (Zaroxolyn)  2.5 mg PO DAILY [facility]


Metolazone (Zaroxolyn)  2.5 mg PO DAILY [facility]


Metoprolol Tartrate (Lopressor)  25 mg PO BID@0700,1800 [facility]


   Last Admin: 01/11/19 06:48 Dose:  Not Given


Tamsulosin HCl (Flomax)  0.4 mg PO QAM [facility]

## 2019-02-10 ENCOUNTER — HOSPITAL ENCOUNTER (INPATIENT)
Dept: HOSPITAL 77 - KA.ED | Age: 79
LOS: 1 days | Discharge: NURSING, REHAB OR LONG-TERM CARE | DRG: 292 | End: 2019-02-11
Attending: FAMILY MEDICINE | Admitting: NURSE PRACTITIONER
Payer: MEDICARE

## 2019-02-10 DIAGNOSIS — L30.4: ICD-10-CM

## 2019-02-10 DIAGNOSIS — R06.02: ICD-10-CM

## 2019-02-10 DIAGNOSIS — R09.02: ICD-10-CM

## 2019-02-10 DIAGNOSIS — I50.9: ICD-10-CM

## 2019-02-10 DIAGNOSIS — N40.0: ICD-10-CM

## 2019-02-10 DIAGNOSIS — F41.9: ICD-10-CM

## 2019-02-10 DIAGNOSIS — Z88.6: ICD-10-CM

## 2019-02-10 DIAGNOSIS — K59.09: ICD-10-CM

## 2019-02-10 DIAGNOSIS — R06.2: ICD-10-CM

## 2019-02-10 DIAGNOSIS — I11.0: ICD-10-CM

## 2019-02-10 DIAGNOSIS — Z90.49: ICD-10-CM

## 2019-02-10 DIAGNOSIS — Z86.73: ICD-10-CM

## 2019-02-10 DIAGNOSIS — Z79.84: ICD-10-CM

## 2019-02-10 DIAGNOSIS — I48.92: ICD-10-CM

## 2019-02-10 DIAGNOSIS — H91.90: ICD-10-CM

## 2019-02-10 DIAGNOSIS — E11.9: ICD-10-CM

## 2019-02-10 DIAGNOSIS — Z79.01: ICD-10-CM

## 2019-02-10 DIAGNOSIS — M54.9: ICD-10-CM

## 2019-02-10 DIAGNOSIS — M17.0: ICD-10-CM

## 2019-02-10 DIAGNOSIS — I50.32: ICD-10-CM

## 2019-02-10 DIAGNOSIS — R41.82: ICD-10-CM

## 2019-02-10 DIAGNOSIS — Z88.8: ICD-10-CM

## 2019-02-10 DIAGNOSIS — Z91.81: ICD-10-CM

## 2019-02-10 DIAGNOSIS — H54.7: ICD-10-CM

## 2019-02-10 DIAGNOSIS — K21.9: ICD-10-CM

## 2019-02-10 DIAGNOSIS — J18.9: Primary | ICD-10-CM

## 2019-02-10 DIAGNOSIS — M19.90: ICD-10-CM

## 2019-02-10 DIAGNOSIS — R06.00: ICD-10-CM

## 2019-02-10 DIAGNOSIS — F32.9: ICD-10-CM

## 2019-02-10 DIAGNOSIS — M10.9: ICD-10-CM

## 2019-02-10 DIAGNOSIS — Z87.01: ICD-10-CM

## 2019-02-10 DIAGNOSIS — E66.01: ICD-10-CM

## 2019-02-10 DIAGNOSIS — F03.90: ICD-10-CM

## 2019-02-10 DIAGNOSIS — I48.91: ICD-10-CM

## 2019-02-10 DIAGNOSIS — Z79.899: ICD-10-CM

## 2019-02-10 DIAGNOSIS — I25.2: ICD-10-CM

## 2019-02-10 DIAGNOSIS — E78.00: ICD-10-CM

## 2019-02-10 DIAGNOSIS — G89.29: ICD-10-CM

## 2019-02-10 LAB
ANION GAP SERPL CALC-SCNC: 11.4 MMOL/L (ref 5–15)
CHLORIDE SERPL-SCNC: 108 MMOL/L (ref 98–115)
SODIUM SERPL-SCNC: 143 MMOL/L (ref 136–145)

## 2019-02-10 RX ADMIN — POTASSIUM CHLORIDE SCH MEQ: 750 TABLET, FILM COATED, EXTENDED RELEASE ORAL at 14:00

## 2019-02-10 RX ADMIN — ACETAMINOPHEN SCH MG: 650 TABLET, FILM COATED, EXTENDED RELEASE ORAL at 17:49

## 2019-02-10 RX ADMIN — CLOTRIMAZOLE AND BETAMETHASONE DIPROPIONATE SCH: 10; .5 CREAM TOPICAL at 15:20

## 2019-02-10 RX ADMIN — CLOTRIMAZOLE AND BETAMETHASONE DIPROPIONATE SCH: 10; .5 CREAM TOPICAL at 21:09

## 2019-02-10 RX ADMIN — ACETAMINOPHEN SCH MG: 650 TABLET, FILM COATED, EXTENDED RELEASE ORAL at 09:05

## 2019-02-10 NOTE — PCM.HP
H&P History of Present Illness





- General


Date of Service: 02/10/19


Admit Problem/Dx: 


 Admission Diagnosis/Problem





Admission Diagnosis/Problem      CAP (community acquired pneumonia) due to MSSA 

(


                                 methicillin sensitive Staphylococcus aureus)














Source of Information: Patient, Family, Other (ER notes)


History Limitations: Reports: Altered Mental Status, Other (dementia.  Wife 

provides the majority of information)





- History of Present Illness


Initial Comments - Free Text/Narative: 


HPI: Pt and wife report that he was feeling fine when he went to bed last 

night.  Wife states he woke her up saying he couldn't breath, she should call 

the ambulance, which she did.  They both report he is feeling much better this 

morning.  They deny any coughing.  He reports he does have a nebulizer at home 

which he uses rarely--less than weekly, maybe less than monthly.


On admission his Temp was 100.4, Heart rate 101, resp 28, O2 Sats 90 and /

57.  This morning his VS are WNL at T:97.9, Pulse bradycardic at 54, Resp 20, 

BP continues mildly elevated at 148/80.





Pt has dementia and lives with his wife at home.  She reports she has checked 

with the local nursing home in [place] to see if they can't both be admitted to 

the NH for the winter.  She reports they were told this was possible and she 

would like to do so.  Pt states he will not go into the nursing home unless his 

wife goes with him.


Clinic problem list included at risk for self care deficit, ineffective 

medication administration routine at home and falls in the setting of being on 

an anticoagulant.





Pt has a history of Htn managed with lisinopril 20 mg daily.


Pt has history of Atrial flutter with controlled response.  He is on coumadin.  

His heart rate has been bradycardic.


He has a history of diabetes managed with Metformin 1000 mg bid.  He is on a 

statin and an ACE


He has a history of BPH managed with tamsulosin 0.4 mg daily and hytrin 5 mg 

daily. 


He has a history of gout with gouty arthropathy and is on allopurinol 300 mg 

daily.


Pt has osteoarthritis with chronic pain of both knees.  He uses tylenol 

arthritis and capsaicin arthritis relief cream prn.


Pt has anxiety managed with Buspar 10 mg bid and librium 10 mg bid 





He was last hospitalized 1/10-1/13/19 with acute on chronic diastolic CHF.  He 

does not use ACE wraps and his legs continue to be very edematous.





Onset of Symptoms: Reports: Sudden


Symptom Onset Date: 02/10/19


Duration of Symptoms: Reports: Hour(s):


Location: Reports: Other (shortness of breath)


  ** denies


Pain Score (Numeric/FACES): 0





- Related Data


Allergies/Adverse Reactions: 


 Allergies











Allergy/AdvReac Type Severity Reaction Status Date / Time


 


omeprazole magnesium Allergy Intermediate Cannot Verified 02/10/19 05:23





[From Prilosec]   Remember  


 


cimetidine [From Tagamet] Allergy  Rash Verified 02/10/19 05:23


 


cimetidine HCl [From Tagamet] Allergy  Rash Verified 02/10/19 05:23


 


codeine Allergy  Hallucinati Verified 02/10/19 05:23





   ons  


 


omeprazole [From Prilosec] Allergy  Cannot Verified 02/10/19 05:23





   Remember  











Home Medications: 


 Home Meds





busPIRone [Buspar] 10 mg PO BIDMEALS 12/06/17 [History]


chlordiazePOXIDE [Librium] 10 mg PO BIDMEALS 12/06/17 [History]


Acetaminophen [Tylenol Arthritis] 650 mg PO Q8H 05/20/18 [History]


Allopurinol [Zyloprim] 300 mg PO DAILY 05/21/18 [History]


Esomeprazole [NexIUM] 40 mg PO ACBREAKFAST 05/21/18 [History]


metFORMIN HCl [Metformin HCl] 1,000 mg PO BIDMEALS 05/21/18 [History]


Capsaicin [Arthritis Pain Relief] 1 applic TP TID PRN 01/10/19 [History]


Tamsulosin [Flomax] 0.4 mg PO QAM 01/10/19 [History]


Terazosin [Hytrin] 5 mg PO DAILY 01/10/19 [History]


atorvaSTATin [Lipitor] 40 mg PO BEDTIME 01/10/19 [History]


Lisinopril 20 mg PO DAILY #30 tab 01/13/19 [Rx]


Warfarin [Coumadin] 5 mg PO DAILY@1800 #30 tablet 01/13/19 [Rx]


Nystatin [Nystatin Crm] 1 gm TOP BID PRN 02/10/19 [History]











Past Medical History


HEENT History: Reports: Cataract, Hard of Hearing, Impaired Vision


Cardiovascular History: Reports: Afib, Heart Failure, High Cholesterol, 

Hypertension, MI


Respiratory History: Reports: Pneumonia, Recurrent


Gastrointestinal History: Reports: Chronic Constipation, GERD, Hemorrhoids


Genitourinary History: Reports: BPH


Musculoskeletal History: Reports: Arthritis, Back Pain, Chronic


Other Musculoskeletal History: chronic knee pain


Neurological History: Reports: TIA


Other Neuro History: this admit


Psychiatric History: Reports: Anxiety, Dementia, Depression, Mood Swings


Endocrine/Metabolic History: Reports: Diabetes, Type II, Obesity/BMI 30+


Hematologic History: Reports: None


Immunologic History: Reports: None


Oncologic (Cancer) History: Reports: None


Dermatologic History: Reports: Other (See Below)


Other Dermatologic History: scattered scabs, picks/scratches at skin





- Infectious Disease History


Infectious Disease History: Reports: Measles





- Past Surgical History


HEENT Surgical History: Reports: Cataract Surgery, Oral Surgery


Respiratory Surgical History: Reports: None


GI Surgical History: Reports: Cholecystectomy, Colonoscopy, EGD, Hernia, 

Abdominal


Oncologic Surgical History: Reports: None


Dermatological Surgical History: Reports: None





Social & Family History





- Family History


Family Medical History: Noncontributory


HEENT: Reports: None


Cardiac: Reports: Hypertension, MI


Respiratory: Reports: None


GI: Reports: None


: Reports: None


OBGYN: Reports: None


Musculoskeletal: Reports: None


Neurological: Reports: None


Psychiatric: Reports: None


Endocrine/Metabolic: Reports: None


Hematologic: Reports: None


Immunologic: Reports: None


Dermatologic: Reports: None


Oncologic: Reports: Prostate





- Tobacco Use


Smoking Status *Q: Never Smoker


Second Hand Smoke Exposure: No





- Caffeine Use


Caffeine Use: Reports: Coffee





- Recreational Drug Use


Recreational Drug Use: No





- Living Situation & Occupation


Living situation: Reports:  (he has dementia with self care deficit.  

His wife provides cares for him at home.  Wanting NH placement)


Occupation: Retired





H&P Review of Systems





- Review of Systems:


Review Of Systems: See Below


General: Reports: Fever, Other (last night only)


HEENT: Reports: No Symptoms


Pulmonary: Reports: Shortness of Breath.  Denies: Wheezing, Cough, Sputum


Cardiovascular: Reports: No Symptoms.  Denies: Chest Pain


Gastrointestinal: Reports: No Symptoms


Genitourinary: Reports: No Symptoms


Musculoskeletal: Reports: Other (osteoarthritis and chronic knee pain)


Skin: Reports: Other (lesions between toes)


Psychiatric: Reports: Anxiety, Other (dementia)


Neurological: Reports: Difficulty Walking (due to arthritis, knee pain and 

obesity)





Exam





- Exam


Exam: See Below





- Vital Signs


Vital Signs: 


 Last Vital Signs











Temp  97.9 F   02/10/19 06:56


 


Pulse  54 L  02/10/19 06:56


 


Resp  20   02/10/19 06:56


 


BP  148/80 H  02/10/19 09:06


 


Pulse Ox  97   02/10/19 06:56











Weight: 329 lb





- Exam


Quality Assessment: Supplemental Oxygen


General: Alert, Cooperative


Lungs: Crackles (few crackles at bilateral bases)


Cardiovascular: Regular Rate (rate 57), Regular Rhythm


GI/Abdominal Exam: Normal Bowel Sounds, Soft, Non-Tender, Other (obese abdomen)


 (Male) Exam: Deferred


Rectal (Males) Exam: Deferred


Extremities: Pedal Edema (3+ pedal edema from toes to knees ruddy.)


Skin: Other (moist fissures and erythema between all toes ruddy.)


Neuro Extensive - Mental Status: Alert.  No: Normal Cognition


Psychiatric: Alert, Normal Mood





- Patient Data


Lab Results Last 24 hrs: 


 Laboratory Results - last 24 hr











  02/10/19 02/10/19 02/10/19 Range/Units





  01:03 01:03 01:03 


 


WBC  5.06    (5.00-10.00)  10^3/uL


 


RBC  4.14 L    (4.50-6.00)  10^6/uL


 


Hgb  12.7 L    (13.0-17.0)  g/dL


 


Hct  39.1 L    (40.0-52.0)  %


 


MCV  94.4 H D    (82.0-92.0)  fL


 


MCH  30.7    (27.0-31.0)  pg


 


MCHC  32.5    (32.0-36.0)  g/dL


 


RDW  15.4 H    (11.5-14.5)  %


 


Plt Count  119 L    (150-400)  10^3/uL


 


MPV  10.1    (7.4-10.4)  fL


 


Immature Gran % (Auto)  0.2    (0.0-5.0)  %


 


Neut % (Auto)  56.5    (50.0-70.0)  %


 


Lymph % (Auto)  32.0    (20.0-40.0)  %


 


Mono % (Auto)  9.5 H    (2.0-8.0)  %


 


Eos % (Auto)  1.2    (1.0-3.0)  %


 


Baso % (Auto)  0.6    (0.0-1.0)  %


 


Immature Gran # (Auto)  0.01    (0.00-0.50)  10^3/uL


 


Neut # (Auto)  2.86    (2.50-7.00)  10^3/uL


 


Lymph # (Auto)  1.62    (1.00-4.00)  10^3/uL


 


Mono # (Auto)  0.48    (0.10-0.80)  10^3/uL


 


Eos # (Auto)  0.06 L    (0.10-0.30)  10^3/uL


 


Baso # (Auto)  0.03    (0.00-0.10)  10^3/uL


 


Sodium   143   (136-145)  mmol/L


 


Potassium   3.6   (3.3-5.3)  mmol/L


 


Chloride   108   ()  mmol/L


 


Carbon Dioxide   27.2   (21.0-32.0)  mmol/L


 


Anion Gap   11.4   (5-15)  mmol/L


 


BUN   15   (6-25)  mg/dL


 


Creatinine   0.88   (0.51-1.17)  mg/dL


 


Est Cr Clr Drug Dosing   70.28   mL/min


 


Estimated GFR (MDRD)   > 60   mL/min


 


Glucose   117 H  (75 - 99) mg/dL


 


Lactic Acid    1.8  (0.4-2.0)  mmol/L


 


Calcium   8.1 L   (8.7-10.3)  mg/dL


 


B-Natriuretic Peptide   93   (0-100)  pg/mL











Result Diagrams: 


 02/10/19 01:03





 02/10/19 01:03


Problem List Initiated/Reviewed/Updated: Yes


Orders Last 24hrs: 


 Active Orders 24 hr











 Category Date Time Status


 


 Oxygen Therapy [RC] PRN Care  02/10/19 05:27 Active


 


 RT Aerosol Therapy [RC] ASDIRECTED Care  02/10/19 05:27 Active


 


 Up With Assistance [RC] ASDIRECTED Care  02/10/19 05:27 Active


 


 Vital Signs [RC] 0300,0700,1100,1500,1900,2300 Care  02/10/19 05:27 Active


 


 Regular Diet [DIET] Diet  02/10/19 Breakfast Active


 


 Chest 2V [CR] Stat Exams  02/10/19 00:19 Stop Req


 


 CULTURE BLOOD [BC] Stat Lab  02/10/19 01:03 Received


 


 CULTURE BLOOD [BC] Stat Lab  02/10/19 01:15 Received


 


 Acetaminophen [Tylenol Arthritis Pain] Med  02/10/19 09:00 Active





 650 mg PO Q8H   


 


 Albuterol/Ipratropium [DuoNeb 3.0-0.5 MG/3 ML] Med  02/10/19 05:27 Active





 3 ml NEB Q4HRRT PRN   


 


 Allopurinol [Zyloprim] Med  02/10/19 09:00 Active





 300 mg PO DAILY   


 


 Capsaicin [Zostrix 0.025% Crm] Med  02/10/19 06:00 Active





 0 gm TOP TID PRN   


 


 Esomeprazole [NexIUM] Med  02/10/19 07:30 Active





 40 mg PO ACBREAKFAST   


 


 Lisinopril [Prinivil] Med  02/10/19 09:00 Active





 20 mg PO DAILY   


 


 Nystatin [Nystatin Crm] Med  02/10/19 05:27 Active





 1 gm TOP BID PRN   


 


 Tamsulosin [Flomax] Med  02/10/19 09:00 Active





 0.4 mg PO QAM   


 


 Terazosin [Hytrin] Med  02/10/19 09:00 Active





 5 mg PO DAILY   


 


 Warfarin [Coumadin] Med  02/10/19 18:00 Active





 5 mg PO DAILY@1800   


 


 atorvaSTATin [Lipitor] Med  02/10/19 21:00 Active





 40 mg PO BEDTIME   


 


 busPIRone [Buspar] Med  02/10/19 08:00 Active





 10 mg PO BIDMEALS   


 


 chlordiazePOXIDE [Librium] Med  02/10/19 08:00 Active





 10 mg PO BIDMEALS   


 


 metFORMIN [Glucophage] Med  02/10/19 08:00 Active





 1,000 mg PO BIDMEALS   








 Medication Orders





Acetaminophen (Tylenol Arthritis Pain)  650 mg PO Q8H [facility]


   Last Admin: 02/10/19 09:05  Dose: 650 mg


Albuterol/Ipratropium (Duoneb 3.0-0.5 Mg/3 Ml)  3 ml NEB Q4HRRT PRN


   PRN Reason: Wheezing


Allopurinol (Zyloprim)  300 mg PO DAILY [facility]


   Last Admin: 02/10/19 09:05  Dose: 300 mg


Atorvastatin Calcium (Lipitor)  40 mg PO BEDTIME [facility]


Buspirone HCl (Buspar)  10 mg PO BIDMEALS [facility]


   Last Admin: 02/10/19 09:01  Dose: 10 mg


Capsaicin (Zostrix 0.025% Crm)  0 gm TOP TID PRN


   PRN Reason: Pain


Chlordiazepoxide HCl (Librium)  10 mg PO BIDMEALS [facility]


   Last Admin: 02/10/19 09:01  Dose: 10 mg


Esomeprazole Magnesium (Nexium)  40 mg PO ACBREAKFAST [facility]


   Last Admin: 02/10/19 08:29  Dose:  


Lisinopril (Prinivil)  20 mg PO DAILY [facility]


   Last Admin: 02/10/19 09:06  Dose: 20 mg


Metformin HCl (Glucophage)  1,000 mg PO BIDMEALS [facility]


   Last Admin: 02/10/19 09:01  Dose: 1,000 mg


Nystatin (Nystatin Crm)  1 gm TOP BID PRN


   PRN Reason: Rash


Tamsulosin HCl (Flomax)  0.4 mg PO QAM [facility]


   Last Admin: 02/10/19 09:05  Dose: 0.4 mg


Terazosin HCl (Hytrin)  5 mg PO DAILY [facility]


   Last Admin: 02/10/19 09:06  Dose: 5 mg


Warfarin Sodium (Coumadin)  5 mg PO DAILY@1800 [facility]








Assessment/Plan Comment:: 





# Sudden onset of shortness of breath without coughing or wheezing.  Fever, 

tachycardia, O2 Sats at 90%.  Preliminary reading of CXR reported Bronchitis, 

bilateral basilar infiltrates.  Final reading today is reporting Bibasilar 

hypoventilatory changes, borderline increased pulmonary vascularity, minimal 

pleural effusion on the right.


He was started on Rocephin and azithromycin for initial assessment of 

pneumonia.  


Labs showing no leukocytosis, no neutrophilia.  BNP is WNL at 93. Renal 

functions are WNL.  VS are stable this morning. Will discontinue antibiotics 

and give lasix 20 mg IV with potassium 10 mEq po for minimal pleural effusion 

on the right.





# Diastolic CHF with pedal edema: He was last hospitalized 1/10-1/13/19 with 

acute on chronic diastolic CHF.  He does not use ACE wraps and his legs 

continue to be very edematous. BNP WNL at 93. Lasix 20 mg IV and potassium 10 

mEq po daily.





# Self care deficit with dementia, ineffective medication administration 

routine at home and falls in the setting of being on an anticoagulant:  Pt has 

been unwilling to go to NH.  Today he is agreeable to NH placement if his wife 

will also go.  She is agreeable to this.   will work on 

placement to [facility]. 








# history of Htn:  continue lisinopril 20 mg daily.


# history of Atrial flutter with controlled response.  Continue coumadin.  His 

heart rate has been bradycardic.  Will monitor INR due to antibiotic 

administration while on coumadin.


# history of diabetes.  Continue Metformin 1000 mg bid.  He is on a statin and 

an ACE


# history of BPH:  continue tamsulosin 0.4 mg daily and hytrin 5 mg daily. 


# history of gout with gouty arthropathy:  Continue allopurinol 300 mg daily.


# osteoarthritis with chronic pain of both knees:  Continue tylenol arthritis 

and capsaicin arthritis relief cream prn.


# anxiety managed with Buspar 10 mg bid and librium 10 mg bid.  continue same.

## 2019-02-10 NOTE — EDM.PDOC
ED HPI GENERAL MEDICAL PROBLEM





- General


Chief Complaint: Respiratory Problem


Stated Complaint: difficulty breathing


Time Seen by Provider: 02/10/19 00:28


Source of Information: Reports: Patient


History Limitations: Reports: No Limitations





- History of Present Illness


INITIAL COMMENTS - FREE TEXT/NARRATIVE: 





Patient brought via ambulance for dyspnea.  He noticed wheezing and shortness 

of breath off and on today but worse this evening.  He doesn't use any inhalers

, nebulizers or oxygen at home he says.  He denies chest pain or abdominal pain.


Treatments PTA: Reports: Oxygen


  ** denies


Pain Score (Numeric/FACES): 0





- Related Data


 Allergies











Allergy/AdvReac Type Severity Reaction Status Date / Time


 


omeprazole magnesium Allergy Intermediate Cannot Verified 02/10/19 00:32





[From Prilosec]   Remember  


 


cimetidine [From Tagamet] Allergy  Rash Verified 02/10/19 00:32


 


cimetidine HCl [From Tagamet] Allergy  Rash Verified 02/10/19 00:32


 


codeine Allergy  Hallucinati Verified 02/10/19 00:32





   ons  


 


omeprazole [From Prilosec] Allergy  Cannot Verified 02/10/19 00:32





   Remember  











Home Meds: 


 Home Meds





busPIRone [Buspar] 10 mg PO BIDMEALS 12/06/17 [History]


chlordiazePOXIDE [Librium] 10 mg PO BIDMEALS 12/06/17 [History]


Acetaminophen [Tylenol Arthritis] 650 mg PO Q8H 05/20/18 [History]


Allopurinol [Zyloprim] 300 mg PO DAILY 05/21/18 [History]


Esomeprazole [NexIUM] 40 mg PO ACBREAKFAST 05/21/18 [History]


metFORMIN HCl [Metformin HCl] 1,000 mg PO BIDMEALS 05/21/18 [History]


Capsaicin [Arthritis Pain Relief] 1 applic TP TID PRN 01/10/19 [History]


Tamsulosin [Flomax] 0.4 mg PO QAM 01/10/19 [History]


Terazosin [Hytrin] 5 mg PO DAILY 01/10/19 [History]


atorvaSTATin [Lipitor] 40 mg PO BEDTIME 01/10/19 [History]


Lisinopril 20 mg PO DAILY #30 tab 01/13/19 [Rx]


Warfarin [Coumadin] 5 mg PO DAILY@1800 #30 tablet 01/13/19 [Rx]


Nystatin [Nystatin Crm] 1 gm TOP BID PRN 02/10/19 [History]











Past Medical History


HEENT History: Reports: Cataract, Hard of Hearing, Impaired Vision


Cardiovascular History: Reports: Afib, Heart Failure, High Cholesterol, 

Hypertension, MI


Respiratory History: Reports: Pneumonia, Recurrent


Gastrointestinal History: Reports: Chronic Constipation, GERD, Hemorrhoids


Genitourinary History: Reports: BPH


Musculoskeletal History: Reports: Arthritis, Back Pain, Chronic


Other Musculoskeletal History: chronic knee pain


Neurological History: Reports: TIA


Other Neuro History: this admit


Psychiatric History: Reports: Anxiety, Dementia, Depression, Mood Swings


Endocrine/Metabolic History: Reports: Diabetes, Type II, Obesity/BMI 30+


Hematologic History: Reports: None


Immunologic History: Reports: None


Oncologic (Cancer) History: Reports: None


Dermatologic History: Reports: Other (See Below)


Other Dermatologic History: scattered scabs, picks/scratches at skin





- Infectious Disease History


Infectious Disease History: Reports: Measles





- Past Surgical History


HEENT Surgical History: Reports: Cataract Surgery, Oral Surgery


Respiratory Surgical History: Reports: None


GI Surgical History: Reports: Cholecystectomy, Colonoscopy, EGD, Hernia, 

Abdominal


Oncologic Surgical History: Reports: None


Dermatological Surgical History: Reports: None





Social & Family History





- Family History


Family Medical History: Noncontributory


HEENT: Reports: None


Cardiac: Reports: Hypertension, MI


Respiratory: Reports: None


GI: Reports: None


: Reports: None


OBGYN: Reports: None


Musculoskeletal: Reports: None


Neurological: Reports: None


Psychiatric: Reports: None


Endocrine/Metabolic: Reports: None


Hematologic: Reports: None


Immunologic: Reports: None


Dermatologic: Reports: None


Oncologic: Reports: Prostate





- Caffeine Use


Caffeine Use: Reports: Coffee





- Living Situation & Occupation


Living situation: Reports: 


Occupation: Retired





ED ROS GENERAL





- Review of Systems


Review Of Systems: See Below


Constitutional: Denies: Malaise, Weakness


HEENT: Denies: Throat Pain


Respiratory: Reports: Shortness of Breath, Wheezing, Cough (2 days)


Cardiovascular: Denies: Chest Pain, Lightheadedness, Syncope


GI/Abdominal: Denies: Abdominal Pain, Diarrhea, Nausea, Vomiting


: Reports: No Symptoms


Musculoskeletal: Reports: No Symptoms


Skin: Denies: Cyanosis, Jaundice, Mottled, Pallor, Diaphoresis


Neurological: Denies: Confusion, Dizziness, Headache, Seizure, Syncope, Change 

in Speech


Psychiatric: Denies: Agitation, Anxiety, Confusion





ED EXAM, GENERAL





- Physical Exam


Exam: See Below


Exam Limited By: No Limitations


General Appearance: Alert, WD/WN, No Apparent Distress


Eye Exam: Bilateral Eye: EOMI, Normal Inspection, PERRL


Ears: Normal External Exam, Hearing Grossly Normal


Nose: Normal Inspection, No Blood


Throat/Mouth: Normal Inspection, Normal Lips, Normal Voice, No Airway Compromise


Head: Atraumatic, Normocephalic


Neck: Normal Inspection, Full Range of Motion


Respiratory/Chest: No Respiratory Distress, Lungs Clear (but distant lung sounds

; this is after the duoneb treatment), Wheezing (initially but cleared with 

duoneb).  No: Stridor


Cardiovascular: Regular Rate, Rhythm, No Murmur


GI/Abdominal: Soft, Non-Tender, No Distention


Back Exam: Normal Inspection, Full Range of Motion.  No: CVA Tenderness (L), 

CVA Tenderness (R)


Neurological: Alert, Oriented, Normal Cognition, No Motor/Sensory Deficits


Psychiatric: Normal Affect, Normal Mood


Skin Exam: Warm, Dry, Intact, Normal Color, No Rash





Course





- Vital Signs


Last Recorded V/S: 


 Last Vital Signs











Temp  100.4 F   02/10/19 00:23


 


Pulse  101 H  02/10/19 00:23


 


Resp  28 H  02/10/19 00:23


 


BP  154/57 H  02/10/19 00:23


 


Pulse Ox  90 L  02/10/19 00:23














- Orders/Labs/Meds


Orders: 


 Active Orders 24 hr











 Category Date Time Status


 


 Patient Status Manage Transfer [TRANSFER] Routine ADT  02/10/19 01:28 Ordered


 


 Patient Status [ADT] Routine ADT  02/10/19 01:24 Ordered


 


 RT Aerosol Therapy [RC] ASDIRECTED Care  02/10/19 00:20 Active


 


 Chest 2V [CR] Stat Exams  02/10/19 00:19 Ordered


 


 CULTURE BLOOD [BC] Stat Lab  02/10/19 01:03 Received


 


 CULTURE BLOOD [BC] Stat Lab  02/10/19 01:15 Received


 


 Azithromycin [Zithromax] 500 mg Med  02/10/19 01:26 Active





 Sodium Chloride 0.9% [Normal Saline] 250 ml   





 IV ONETIME   


 


 cefTRIAXone [Rocephin] Med  02/10/19 01:30 Active





 1 gm IVPUSH Q24H   


 


 Blood Culture x2 Reflex Set [OM.PC] Stat Oth  02/10/19 00:51 Ordered








 Medication Orders





Ceftriaxone Sodium (Rocephin)  1 gm IVPUSH Q24H YESENIA


   Last Admin: 02/10/19 01:32  Dose: 1 gm


Azithromycin 500 mg/ Sodium (Chloride)  250 mls @ 250 mls/hr IV ONETIME ONE


   Stop: 02/10/19 02:25








Labs: 


 Laboratory Tests











  02/10/19 02/10/19 02/10/19 Range/Units





  01:03 01:03 01:03 


 


WBC  5.06    (5.00-10.00)  10^3/uL


 


RBC  4.14 L    (4.50-6.00)  10^6/uL


 


Hgb  12.7 L    (13.0-17.0)  g/dL


 


Hct  39.1 L    (40.0-52.0)  %


 


MCV  94.4 H D    (82.0-92.0)  fL


 


MCH  30.7    (27.0-31.0)  pg


 


MCHC  32.5    (32.0-36.0)  g/dL


 


RDW  15.4 H    (11.5-14.5)  %


 


Plt Count  119 L    (150-400)  10^3/uL


 


MPV  10.1    (7.4-10.4)  fL


 


Immature Gran % (Auto)  0.2    (0.0-5.0)  %


 


Neut % (Auto)  56.5    (50.0-70.0)  %


 


Lymph % (Auto)  32.0    (20.0-40.0)  %


 


Mono % (Auto)  9.5 H    (2.0-8.0)  %


 


Eos % (Auto)  1.2    (1.0-3.0)  %


 


Baso % (Auto)  0.6    (0.0-1.0)  %


 


Immature Gran # (Auto)  0.01    (0.00-0.50)  10^3/uL


 


Neut # (Auto)  2.86    (2.50-7.00)  10^3/uL


 


Lymph # (Auto)  1.62    (1.00-4.00)  10^3/uL


 


Mono # (Auto)  0.48    (0.10-0.80)  10^3/uL


 


Eos # (Auto)  0.06 L    (0.10-0.30)  10^3/uL


 


Baso # (Auto)  0.03    (0.00-0.10)  10^3/uL


 


Sodium   143   (136-145)  mmol/L


 


Potassium   3.6   (3.3-5.3)  mmol/L


 


Chloride   108   ()  mmol/L


 


Carbon Dioxide   27.2   (21.0-32.0)  mmol/L


 


Anion Gap   11.4   (5-15)  mmol/L


 


BUN   15   (6-25)  mg/dL


 


Creatinine   0.88   (0.51-1.17)  mg/dL


 


Est Cr Clr Drug Dosing   70.28   mL/min


 


Estimated GFR (MDRD)   > 60   mL/min


 


Glucose   117 H  (75 - 99) mg/dL


 


Lactic Acid    1.8  (0.4-2.0)  mmol/L


 


Calcium   8.1 L   (8.7-10.3)  mg/dL


 


B-Natriuretic Peptide   93   (0-100)  pg/mL











Meds: 


Medications











Generic Name Dose Route Start Last Admin





  Trade Name Freq  PRN Reason Stop Dose Admin


 


Ceftriaxone Sodium  1 gm  02/10/19 01:30  02/10/19 01:32





  Rocephin  IVPUSH   1 gm





  Q24H YESENIA   Administration





     





     





     





     


 


Azithromycin 500 mg/ Sodium  250 mls @ 250 mls/hr  02/10/19 01:26  





  Chloride  IV  02/10/19 02:25  





  ONETIME ONE   





     





     





     





     














Discontinued Medications














Generic Name Dose Route Start Last Admin





  Trade Name Freq  PRN Reason Stop Dose Admin


 


Albuterol/Ipratropium  3 ml  02/10/19 00:20  02/10/19 00:22





  Duoneb 3.0-0.5 Mg/3 Ml  NEB  02/10/19 00:21  3 ml





  ONETIME ONE   Administration





     





     





     





     


 


Albuterol/Ipratropium  Confirm  02/10/19 00:17  02/10/19 00:22





  Duoneb 3.0-0.5 Mg/3 Ml  Administered  02/10/19 00:18  Not Given





  Dose   





  3 ml   





  .ROUTE   





  .STK-MED ONE   





     





     





     





     














- Re-Assessments/Exams


Free Text/Narrative Re-Assessment/Exam: 





02/10/19 01:21


CXR shows bilat infiltrates and peribronchial interstitial thickening 

suggesting bronchitis also.  Labs pending.  Breathing improved significantly 

with DuoNeb but he still desatted to 88% off oxygen while transferring for xray.


02/10/19 01:50


Labs okay.  Discussed findings and treatment plan with patient who agreed with 

admission.  Discussed also with Kemi Estrada NP who accepted for admission.  

Patient was started on Rocephin and Azithromycin as ER patient and transferred 

from ER in stable condition.  





Departure





- Departure


Time of Disposition: 01:52


Disposition: Admitted As Inpatient 65


Condition: Good


Clinical Impression: 


 Hypoxemia requiring supplemental oxygen





CAP (community acquired pneumonia)


Qualifiers:


 Laterality: unspecified laterality Qualified Code(s): J18.9 - Pneumonia, 

unspecified organism








- Discharge Information


Forms:  ED Department Discharge





- My Orders


Last 24 Hours: 


My Active Orders





02/10/19 00:19


Chest 2V [CR] Stat 





02/10/19 00:20


RT Aerosol Therapy [RC] ASDIRECTED 





02/10/19 00:51


Blood Culture x2 Reflex Set [OM.PC] Stat 





02/10/19 01:03


CULTURE BLOOD [BC] Stat 





02/10/19 01:15


CULTURE BLOOD [BC] Stat 





02/10/19 01:24


Patient Status [ADT] Routine 





02/10/19 01:26


Azithromycin [Zithromax] 500 mg   Sodium Chloride 0.9% [Normal Saline] 250 ml 

IV ONETIME 





02/10/19 01:28


Patient Status Manage Transfer [TRANSFER] Routine 





02/10/19 01:30


cefTRIAXone [Rocephin]   1 gm IVPUSH Q24H 














- Assessment/Plan


Last 24 Hours: 


My Active Orders





02/10/19 00:19


Chest 2V [CR] Stat 





02/10/19 00:20


RT Aerosol Therapy [RC] ASDIRECTED 





02/10/19 00:51


Blood Culture x2 Reflex Set [OM.PC] Stat 





02/10/19 01:03


CULTURE BLOOD [BC] Stat 





02/10/19 01:15


CULTURE BLOOD [BC] Stat 





02/10/19 01:24


Patient Status [ADT] Routine 





02/10/19 01:26


Azithromycin [Zithromax] 500 mg   Sodium Chloride 0.9% [Normal Saline] 250 ml 

IV ONETIME 





02/10/19 01:28


Patient Status Manage Transfer [TRANSFER] Routine 





02/10/19 01:30


cefTRIAXone [Rocephin]   1 gm IVPUSH Q24H

## 2019-02-11 VITALS — SYSTOLIC BLOOD PRESSURE: 153 MMHG | DIASTOLIC BLOOD PRESSURE: 76 MMHG

## 2019-02-11 LAB
ANION GAP SERPL CALC-SCNC: 16.2 MMOL/L (ref 5–15)
CHLORIDE SERPL-SCNC: 107 MMOL/L (ref 98–115)
SODIUM SERPL-SCNC: 147 MMOL/L (ref 136–145)

## 2019-02-11 RX ADMIN — POTASSIUM CHLORIDE SCH MEQ: 750 TABLET, FILM COATED, EXTENDED RELEASE ORAL at 08:26

## 2019-02-11 RX ADMIN — ACETAMINOPHEN SCH MG: 650 TABLET, FILM COATED, EXTENDED RELEASE ORAL at 08:26

## 2019-02-11 RX ADMIN — ACETAMINOPHEN SCH MG: 650 TABLET, FILM COATED, EXTENDED RELEASE ORAL at 00:04

## 2019-02-11 NOTE — PCM.DCSUM1
**Discharge Summary





- Hospital Course


Diagnosis: Stroke: No





- Discharge Data


Discharge Date: 02/11/19


Discharge Disposition: DC/Tfer to [facility]


Condition: Fair





- Patient Instructions


Diet: Diabetic Diet


Activity: As Tolerated, Cough & Deep Breathe


Driving: Do Not Drive


Showering/Bathing: May Shower


Notify Provider of: Fever


Other/Special Instructions: Report any further shortness of breath, chest pain, 

fever.





- Discharge Plan


*PRESCRIPTION DRUG MONITORING PROGRAM REVIEWED*: Not Applicable


*COPY OF PRESCRIPTION DRUG MONITORING REPORT IN PATIENT ROGER: Not Applicable


Home Medications: 


 Home Meds





busPIRone [Buspar] 10 mg PO BIDMEALS 12/06/17 [History]


chlordiazePOXIDE [Librium] 10 mg PO BIDMEALS 12/06/17 [History]


Acetaminophen [Tylenol Arthritis] 650 mg PO Q8H 05/20/18 [History]


Allopurinol [Zyloprim] 300 mg PO DAILY 05/21/18 [History]


Esomeprazole [NexIUM] 40 mg PO ACBREAKFAST 05/21/18 [History]


metFORMIN HCl [Metformin HCl] 1,000 mg PO BIDMEALS 05/21/18 [History]


Capsaicin [Arthritis Pain Relief] 1 applic TP TID PRN 01/10/19 [History]


Tamsulosin [Flomax] 0.4 mg PO QAM 01/10/19 [History]


Terazosin [Hytrin] 5 mg PO DAILY 01/10/19 [History]


atorvaSTATin [Lipitor] 40 mg PO BEDTIME 01/10/19 [History]


Lisinopril 20 mg PO DAILY #30 tab 01/13/19 [Rx]


Warfarin [Coumadin] 5 mg PO DAILY@1800 #30 tablet 01/13/19 [Rx]


Nystatin [Nystatin Crm] 1 gm TOP BID PRN 02/10/19 [History]








Referrals: 


[facility] [Outside]


PCP,Unobtain [Ordering Only Provider] - 





- Discharge Summary/Plan Comment


DC Time >30 min.: Yes


Discharge Summary/Plan Comment: 


Final diagnosis


Self care deficit with dementia


HTN managed with lisinopril 20 mg daily.


Atrial flutter CVR.  


T2DM


BPH 


gout with gouty arthropathy 


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  H


Anxiety 


CHF, chronic, stable 








History summary


Mark is a 78 morbid obese gentleman with multiple hospitalizations in the 

recent past with declining overall health and debilitation.  He presented to 

the ED late at night due to a sudden onset of shortness of breath. Speaking 

with the is a poor historian however he did state that he woke his spouse up 

due to shortness of breath. Denied chest pain or any other acute illness. In 

the ED  his Temp was 100.4, Heart rate 101, resp 28, O2 Sats 90 and /57.  

Over the following morning he was afebrile. 





Hospital course


Hospital course was quite uneventful, he was giving diuretics 20 mg 2/2 

shortness of breath. Antibiotics were initially started however this was 

quickly discontinued based off a physical examination, chest x-ray, review of 

systems, and a constellation of signs and symptoms not supportive of pneumonia. 

Preliminary reading of CXR reported Bronchitis, bilateral basilar infiltrates 

however final and subsequent termination demonstrated bibasilar hypoventilatory 

changes, borderline increased pulmonary vascularity, minimal pleural effusion 

on the right. Potassium was corrected.  Blood cultures surveillance no growth 

after 1 day. BNP stable at 93





Medication changes/adjustments upon discharge


None, continue all home medications





Disposition:


patient will be transferred to long-term care likely for 6 months to 

significant decline in health, instability on his gait, strengthening, he will 

need PT OT medication management. 





- General Info


Functional Status: Denies: Pain Controlled (Bilateral knee pain)





- Review of Systems


General: Reports: Weakness.  Denies: Fever, Night Sweats


HEENT: Reports: No Symptoms


Pulmonary: Denies: Shortness of Breath, Cough, Sputum, Wheezing


Cardiovascular: Reports: Edema


Gastrointestinal: Reports: No Symptoms


Genitourinary: Reports: No Symptoms


Musculoskeletal: Reports: Other (Bilateral knee pain)


Skin: Reports: Bruising


Neurological: Reports: Confusion


Psychiatric: Reports: Confusion.  Denies: Agitation





- Patient Data


Vitals - Most Recent: 


 Last Vital Signs











Temp  97.9 F   02/11/19 06:30


 


Pulse  57 L  02/11/19 06:30


 


Resp  20   02/11/19 06:30


 


BP  172/85 H  02/11/19 08:44


 


Pulse Ox  93 L  02/11/19 06:30











Weight - Most Recent: 319 lb 9 oz


I&O - Last 24 hours: 


 Intake & Output











 02/10/19 02/11/19 02/11/19





 22:59 06:59 14:59


 


Intake Total 1210 250 


 


Output Total 150 850 


 


Balance 1060 -600 











Lab Results - Last 24 hrs: 


 Laboratory Results - last 24 hr











  02/10/19 02/11/19 02/11/19 Range/Units





  21:08 06:17 07:20 


 


WBC    5.75  (5.00-10.00)  10^3/uL


 


RBC    4.29 L  (4.50-6.00)  10^6/uL


 


Hgb    12.8 L  (13.0-17.0)  g/dL


 


Hct    39.8 L  (40.0-52.0)  %


 


MCV    92.8 H  (82.0-92.0)  fL


 


MCH    29.8  (27.0-31.0)  pg


 


MCHC    32.2  (32.0-36.0)  g/dL


 


RDW    15.4 H  (11.5-14.5)  %


 


Plt Count    133 L  (150-400)  10^3/uL


 


MPV    10.1  (7.4-10.4)  fL


 


Immature Gran % (Auto)    0.2  (0.0-5.0)  %


 


Neut % (Auto)    64.0  (50.0-70.0)  %


 


Lymph % (Auto)    25.4  (20.0-40.0)  %


 


Mono % (Auto)    9.9 H  (2.0-8.0)  %


 


Eos % (Auto)    0.2 L  (1.0-3.0)  %


 


Baso % (Auto)    0.3  (0.0-1.0)  %


 


Immature Gran # (Auto)    0.01  (0.00-0.50)  10^3/uL


 


Neut # (Auto)    3.68  (2.50-7.00)  10^3/uL


 


Lymph # (Auto)    1.46  (1.00-4.00)  10^3/uL


 


Mono # (Auto)    0.57  (0.10-0.80)  10^3/uL


 


Eos # (Auto)    0.01 L  (0.10-0.30)  10^3/uL


 


Baso # (Auto)    0.02  (0.00-0.10)  10^3/uL


 


PT     (8.9-11.4)  SEC


 


INR     (0.9-1.1)  


 


Sodium     (136-145)  mmol/L


 


Potassium     (3.3-5.3)  mmol/L


 


Chloride     ()  mmol/L


 


Carbon Dioxide     (21.0-32.0)  mmol/L


 


Anion Gap     (5-15)  mmol/L


 


BUN     (6-25)  mg/dL


 


Creatinine     (0.51-1.17)  mg/dL


 


Est Cr Clr Drug Dosing     mL/min


 


Estimated GFR (MDRD)     mL/min


 


Glucose    (75 - 99) mg/dL


 


POC Glucose  118 H  114 H   ()  mg/dl


 


Calcium     (8.7-10.3)  mg/dL














  02/11/19 02/11/19 Range/Units





  07:20 07:20 


 


WBC    (5.00-10.00)  10^3/uL


 


RBC    (4.50-6.00)  10^6/uL


 


Hgb    (13.0-17.0)  g/dL


 


Hct    (40.0-52.0)  %


 


MCV    (82.0-92.0)  fL


 


MCH    (27.0-31.0)  pg


 


MCHC    (32.0-36.0)  g/dL


 


RDW    (11.5-14.5)  %


 


Plt Count    (150-400)  10^3/uL


 


MPV    (7.4-10.4)  fL


 


Immature Gran % (Auto)    (0.0-5.0)  %


 


Neut % (Auto)    (50.0-70.0)  %


 


Lymph % (Auto)    (20.0-40.0)  %


 


Mono % (Auto)    (2.0-8.0)  %


 


Eos % (Auto)    (1.0-3.0)  %


 


Baso % (Auto)    (0.0-1.0)  %


 


Immature Gran # (Auto)    (0.00-0.50)  10^3/uL


 


Neut # (Auto)    (2.50-7.00)  10^3/uL


 


Lymph # (Auto)    (1.00-4.00)  10^3/uL


 


Mono # (Auto)    (0.10-0.80)  10^3/uL


 


Eos # (Auto)    (0.10-0.30)  10^3/uL


 


Baso # (Auto)    (0.00-0.10)  10^3/uL


 


PT  24.0 H D   (8.9-11.4)  SEC


 


INR  2.4 H   (0.9-1.1)  


 


Sodium   147 H  (136-145)  mmol/L


 


Potassium   3.7  (3.3-5.3)  mmol/L


 


Chloride   107  ()  mmol/L


 


Carbon Dioxide   27.5  (21.0-32.0)  mmol/L


 


Anion Gap   16.2 H  (5-15)  mmol/L


 


BUN   12  (6-25)  mg/dL


 


Creatinine   0.85  (0.51-1.17)  mg/dL


 


Est Cr Clr Drug Dosing   72.76  mL/min


 


Estimated GFR (MDRD)   > 60  mL/min


 


Glucose   114 H (75 - 99) mg/dL


 


POC Glucose    ()  mg/dl


 


Calcium   8.2 L  (8.7-10.3)  mg/dL











GABY Results - Last 24 hrs: 


 Microbiology











 02/10/19 01:15 Aerobic Blood Culture - Preliminary





 Blood - Venous - Lab Draw    NO GROWTH AFTER 1 DAY





 Anaerobic Blood Culture - Preliminary





    NO GROWTH AFTER 1 DAY


 


 02/10/19 01:03 Aerobic Blood Culture - Preliminary





 Blood - Venous    NO GROWTH AFTER 1 DAY





 Anaerobic Blood Culture - Preliminary





    NO GROWTH AFTER 1 DAY











Med Orders - Current: 


 Current Medications





Acetaminophen (Tylenol Arthritis Pain)  650 mg PO Q8H [facility]


   Last Admin: 02/11/19 08:26 Dose:  650 mg


Albuterol/Ipratropium (Duoneb 3.0-0.5 Mg/3 Ml)  3 ml NEB Q4HRRT PRN


   PRN Reason: Wheezing


Allopurinol (Zyloprim)  300 mg PO DAILY [facility]


   Last Admin: 02/11/19 08:25 Dose:  300 mg


Atorvastatin Calcium (Lipitor)  40 mg PO BEDTIME [facility]


   Last Admin: 02/10/19 21:09 Dose:  40 mg


Buspirone HCl (Buspar)  10 mg PO BIDMEALS [facility]


   Last Admin: 02/11/19 08:26 Dose:  10 mg


Capsaicin (Zostrix 0.025% Crm)  0 gm TOP TID PRN


   PRN Reason: Pain


Chlordiazepoxide HCl (Librium)  10 mg PO BIDMEALS [facility]


   Last Admin: 02/11/19 08:25 Dose:  10 mg


Furosemide (Lasix)  20 mg IVPUSH DAILY [facility]


   Last Admin: 02/11/19 08:27 Dose:  20 mg


Lisinopril (Prinivil)  20 mg PO DAILY [facility]


   Last Admin: 02/11/19 08:28 Dose:  20 mg


Metformin HCl (Glucophage)  1,000 mg PO BIDMEALS [facility]


   Last Admin: 02/11/19 08:26 Dose:  1,000 mg


Nystatin (Nystatin Crm)  1 gm TOP BID PRN


   PRN Reason: Rash


Nystatin/Triamcinolone Acetonide (Mycolog Crm)  0 gm TOP BID [facility]


   Stop: 02/24/19 23:00


Pantoprazole Sodium (Protonix***)  40 mg PO ACBREAKFAST [facility]


   Last Admin: 02/11/19 08:45 Dose:  40 mg


Potassium Chloride (Klor-Con 10)  10 meq PO DAILY [facility]


   Last Admin: 02/11/19 08:26 Dose:  10 meq


Sodium Chloride (Saline Flush)  10 ml FLUSH Q8HR PRN


   PRN Reason: keep IV open


Tamsulosin HCl (Flomax)  0.4 mg PO QAM [facility]


   Last Admin: 02/11/19 08:25 Dose:  0.4 mg


Terazosin HCl (Hytrin)  5 mg PO DAILY [facility]


   Last Admin: 02/11/19 08:44 Dose:  5 mg


Warfarin Sodium (Coumadin)  5 mg PO DAILY@1800 [facility]


   Last Admin: 02/10/19 17:49 Dose:  5 mg





Discontinued Medications





Acetaminophen (Tylenol Arthritis Pain)  650 mg PO Q8H [facility]


   Last Admin: 02/10/19 08:02 Dose:  Not Given


Albuterol/Ipratropium (Duoneb 3.0-0.5 Mg/3 Ml)  3 ml NEB ONETIME ONE


   Stop: 02/10/19 00:21


   Last Admin: 02/10/19 00:22 Dose:  3 ml


Albuterol/Ipratropium (Duoneb 3.0-0.5 Mg/3 Ml) Confirm Administered Dose 3 ml 

.ROUTE .STK-MED ONE


   Stop: 02/10/19 00:18


   Last Admin: 02/10/19 00:22 Dose:  Not Given


Betamethasone/Clotrimazole (Lotrisone)  0 gm TOP BID [facility]


   Stop: 02/24/19 12:01


   Last Admin: 02/10/19 21:09 Dose:  Not Given


Ceftriaxone Sodium (Rocephin)  1 gm IVPUSH Q24H [facility]


   Last Admin: 02/10/19 01:32 Dose:  1 gm


Ceftriaxone Sodium (Rocephin)  1 gm IVPUSH Q24H [facility]


Esomeprazole Magnesium (Nexium)  40 mg PO ACBREAKFAST [facility]


   Last Admin: 02/11/19 07:52 Dose:  Not Given


Azithromycin 500 mg/ Sodium (Chloride)  250 mls @ 250 mls/hr IV ONETIME ONE


   Stop: 02/10/19 02:25


   Last Admin: 02/10/19 02:28 Dose:  250 mls/hr


Azithromycin 250 mg/ Sodium (Chloride)  250 mls @ 250 mls/hr IV Q24H [facility]


Pantoprazole Sodium (Protonix***)  40 mg PO ACBREAKFAST [facility]











- Exam


Quality Assessment: Denies: Supplemental Oxygen


General: Reports: Alert, Oriented (Confused at times however lucid and able to 

& answer simple questions), Cooperative, No Acute Distress


Neck: Reports: Supple


Lungs: Reports: Clear to Auscultation, Normal Respiratory Effort.  Denies: 

Decreased Breath Sounds, Stridor


Cardiovascular: Reports: Irregular Rhythm.  Denies: Tachycardia


GI/Abdominal Exam: Normal Bowel Sounds, No Abnormal Bruit


 (Male) Exam: Deferred


Back Exam: Denies: CVA Tenderness (L), CVA Tenderness (R)


Extremities: Pedal Edema (3+ BLE)


Skin: Reports: Other (Upper extremities dryness, moist rash intertigo)


Neurological: Reports: Normal Speech, Normal Tone, Sensation Intact.  Denies: 

Normal Gait (Needs assist unsteady on feet)


Psy/Mental Status: Reports: Alert, Normal Affect.  Denies: Agitated, 

Hallucinations

## 2019-03-28 ENCOUNTER — HOSPITAL ENCOUNTER (EMERGENCY)
Dept: HOSPITAL 77 - KA.ED | Age: 79
LOS: 1 days | Discharge: NURSING, REHAB OR LONG-TERM CARE | End: 2019-03-29
Payer: MEDICARE

## 2019-03-28 VITALS — DIASTOLIC BLOOD PRESSURE: 55 MMHG | SYSTOLIC BLOOD PRESSURE: 139 MMHG

## 2019-03-28 DIAGNOSIS — E66.9: ICD-10-CM

## 2019-03-28 DIAGNOSIS — I50.9: ICD-10-CM

## 2019-03-28 DIAGNOSIS — Z88.5: ICD-10-CM

## 2019-03-28 DIAGNOSIS — E11.9: ICD-10-CM

## 2019-03-28 DIAGNOSIS — S51.802A: Primary | ICD-10-CM

## 2019-03-28 DIAGNOSIS — I11.0: ICD-10-CM

## 2019-03-28 DIAGNOSIS — Z79.899: ICD-10-CM
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
## 2019-03-28 DIAGNOSIS — Z88.8: ICD-10-CM
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
## 2019-03-28 NOTE — EDM.PDOC
ED HPI GENERAL MEDICAL PROBLEM





- General


Chief Complaint: General


Stated Complaint: FALL


Time Seen by Provider: 03/28/19 22:46


Source of Information: Reports: Patient, EMS, EMS Notes Reviewed, Family (Wife)





- History of Present Illness


INITIAL COMMENTS - FREE TEXT/NARRATIVE: 





Patient is an 79-year-old gentleman who presents to the emergency Department 

this evening via EMS secondary to fall while at [facility] in [facility] this evening at approximately 2100.  Per wife, she states that 

they were watching TV, he used his walker to go to the bathroom, and she heard 

him fall in the bathroom.  She proceeded to the bathroom, was unable to get him 

up from floor, and called 911.  Patient was transported via EMS and 

documentation shows no neuro focal deficit at that time.  Upon presentation to 

the ER, patient is a alert, awake, oriented and follows commands.  Wife is also 

present.  Patient is well-known to this facility, and evaluation by ER nurse 

confirms patient is acting appropriately and his typical presentation.  Patient 

denies chest pain, shortness of breath, nausea, vomiting, headache, hip pain, 

extremity pain, or cervical pain.


Onset: Today


Onset Date: 03/28/19


Onset Time: 21:00


Location: Reports: Face, Upper Extremity, Left, Lower Extremity, Right


Quality: Reports: Other (Denies any pain)


Improves with: Reports: None


Worsens with: Reports: None


Context: Reports: Other (Fall / unwitnessed)


Associated Symptoms: Reports: No Other Symptoms





- Related Data


 Allergies











Allergy/AdvReac Type Severity Reaction Status Date / Time


 


omeprazole magnesium Allergy Intermediate Cannot Verified 03/28/19 23:17





[From Prilosec]   Remember  


 


cimetidine [From Tagamet] Allergy  Rash Verified 03/28/19 23:17


 


cimetidine HCl [From Tagamet] Allergy  Rash Verified 03/28/19 23:17


 


codeine Allergy  Hallucinati Verified 03/28/19 23:17





   ons  


 


omeprazole [From Prilosec] Allergy  Cannot Verified 03/28/19 23:17





   Remember  











Home Meds: 


 Home Meds





busPIRone [Buspar] 10 mg PO BIDMEALS 12/06/17 [History]


chlordiazePOXIDE [Librium] 10 mg PO BIDMEALS 12/06/17 [History]


Acetaminophen [Tylenol Arthritis] 650 mg PO Q8H 05/20/18 [History]


Allopurinol [Zyloprim] 300 mg PO DAILY 05/21/18 [History]


Esomeprazole [NexIUM] 40 mg PO ACBREAKFAST 05/21/18 [History]


metFORMIN HCl [Metformin HCl] 1,000 mg PO BIDMEALS 05/21/18 [History]


Tamsulosin [Flomax] 0.4 mg PO QAM 01/10/19 [History]


Terazosin [Hytrin] 5 mg PO DAILY 01/10/19 [History]


atorvaSTATin [Lipitor] 40 mg PO BEDTIME 01/10/19 [History]


Lisinopril 20 mg PO DAILY #30 tab 01/13/19 [Rx]


Furosemide [Lasix] 20 mg PO DAILY 03/28/19 [History]


Warfarin [Coumadin] 7.5 mg PO DAILY@1800 03/28/19 [History]


risperiDONE [RisperiDAL] 0.25 mg PO BEDTIME 03/28/19 [History]











Past Medical History


HEENT History: Reports: Cataract, Hard of Hearing, Impaired Vision


Cardiovascular History: Reports: Afib, Heart Failure, High Cholesterol, 

Hypertension, MI


Respiratory History: Reports: Pneumonia, Recurrent


Gastrointestinal History: Reports: Chronic Constipation, GERD, Hemorrhoids


Genitourinary History: Reports: BPH


Musculoskeletal History: Reports: Arthritis, Back Pain, Chronic


Other Musculoskeletal History: chronic knee pain


Neurological History: Reports: TIA


Other Neuro History: this admit


Psychiatric History: Reports: Anxiety, Dementia, Depression, Mood Swings


Endocrine/Metabolic History: Reports: Diabetes, Type II, Obesity/BMI 30+


Hematologic History: Reports: None


Immunologic History: Reports: None


Oncologic (Cancer) History: Reports: None


Dermatologic History: Reports: Other (See Below)


Other Dermatologic History: scattered scabs, picks/scratches at skin





- Infectious Disease History


Infectious Disease History: Reports: Measles





- Past Surgical History


HEENT Surgical History: Reports: Cataract Surgery, Oral Surgery


Respiratory Surgical History: Reports: None


GI Surgical History: Reports: Cholecystectomy, Colonoscopy, EGD, Hernia, 

Abdominal


Oncologic Surgical History: Reports: None


Dermatological Surgical History: Reports: None





Social & Family History





- Family History


Family Medical History: Noncontributory


HEENT: Reports: None


Cardiac: Reports: Hypertension, MI


Respiratory: Reports: None


GI: Reports: None


: Reports: None


OBGYN: Reports: None


Musculoskeletal: Reports: None


Neurological: Reports: None


Psychiatric: Reports: None


Endocrine/Metabolic: Reports: None


Hematologic: Reports: None


Immunologic: Reports: None


Dermatologic: Reports: None


Oncologic: Reports: Prostate





- Caffeine Use


Caffeine Use: Reports: Coffee





- Living Situation & Occupation


Living situation: Reports:  (he has dementia with self care deficit.  

His wife provides cares for him at home.  Wanting NH placement)


Occupation: Retired





ED ROS GENERAL





- Review of Systems


Review Of Systems: ROS reveals no pertinent complaints other than HPI.


Constitutional: Reports: No Symptoms


HEENT: Reports: No Symptoms


Respiratory: Reports: No Symptoms


Cardiovascular: Reports: No Symptoms


Endocrine: Reports: No Symptoms


GI/Abdominal: Reports: No Symptoms


: Reports: No Symptoms


Musculoskeletal: Reports: No Symptoms


Skin: Reports: Wound (Small skin tears at left forearm and right knee, no 

erythema, edema, or deformity noted)


Neurological: Reports: Pre-Existing Deficit (Baseline)


Psychiatric: Reports: No Symptoms


Hematologic/Lymphatic: Reports: No Symptoms


Immunologic: Reports: No Symptoms





ED EXAM, GENERAL





- Physical Exam


Exam: See Below


Exam Limited By: No Limitations


General Appearance: Alert, WD/WN, No Apparent Distress


Eye Exam: Bilateral Eye: Normal Inspection


Ears: Normal External Exam, Normal Canal, Normal TMs


Nose: Normal Inspection, No Blood


Throat/Mouth: Normal Inspection, Normal Oropharynx, No Airway Compromise


Head: Facial Swelling (Minor right cheek abrasion with a 1 cm area of edema.  

No ocular involvement)


Neck: Normal Inspection, Supple, Non-Tender, Full Range of Motion


Respiratory/Chest: No Respiratory Distress, Lungs Clear, Normal Breath Sounds, 

No Accessory Muscle Use, Chest Non-Tender


Cardiovascular: Regular Rate, Rhythm


GI/Abdominal: Normal Bowel Sounds, Soft, Non-Tender


Back Exam: Normal Inspection.  No: CVA Tenderness (L), CVA Tenderness (R)


Extremities: Non-Tender, Pedal Edema (3+ of chronic nature), Other (Left 

forearm with 1 cm skin avulsion)


Neurological: Alert, Oriented, Normal Cognition, Other (Interacting as typical 

for patient.  Described by nursing staff who is familiar with patient.)


Psychiatric: Normal Affect, Normal Mood


Skin Exam: Warm, Dry, Wound/Incision (As above), Other (Multiple small 

excoriations on upper extremities and trunk.)





EKG INTERPRETATION


EKG Date: 03/28/19


Time: 22:55


Rhythm: A-Flutter


Rate (Beats/Min): 55


Axis: LAD-Left Axis Deviation


QRS: LBBB


ST-T: Normal


QT: Normal


Comparison: No Change (From EKG on 01/10/2019)





Course





- Vital Signs


Last Recorded V/S: 


 Last Vital Signs











Temp  97 F   03/28/19 22:35


 


Pulse  55 L  03/28/19 22:35


 


Resp  18   03/28/19 22:35


 


BP  139/55 L  03/28/19 22:35


 


Pulse Ox  91 L  03/28/19 22:35














- Orders/Labs/Meds


Orders: 


 Active Orders 24 hr











 Category Date Time Status


 


 EKG Documentation Completion [RC] ASDIRECTED Care  03/28/19 22:47 Ordered


 


 CULTURE URINE [RM] Stat Lab  03/29/19 00:11 Ordered


 


 INR,PT,PROTHROMBIN TIME [COAG] Stat Lab  03/28/19 23:22 Ordered


 


 EKG 12 Lead [EK] Routine Ther  03/28/19 22:46 Ordered











Labs: 


 Laboratory Tests











  03/28/19 03/28/19 03/28/19 Range/Units





  23:35 23:40 23:40 


 


WBC   5.27   (5.00-10.00)  10^3/uL


 


RBC   4.19 L   (4.50-6.00)  10^6/uL


 


Hgb   12.3 L   (13.0-17.0)  g/dL


 


Hct   39.9 L   (40.0-52.0)  %


 


MCV   95.2 H   (82.0-92.0)  fL


 


MCH   29.4   (27.0-31.0)  pg


 


MCHC   30.8 L   (32.0-36.0)  g/dL


 


RDW   15.0 H   (11.5-14.5)  %


 


Plt Count   120 L   (150-400)  10^3/uL


 


MPV   10.6 H   (7.4-10.4)  fL


 


Immature Gran % (Auto)   0.2   (0.0-5.0)  %


 


Neut % (Auto)   76.6 H   (50.0-70.0)  %


 


Lymph % (Auto)   15.0 L   (20.0-40.0)  %


 


Mono % (Auto)   7.2   (2.0-8.0)  %


 


Eos % (Auto)   0.6 L   (1.0-3.0)  %


 


Baso % (Auto)   0.4   (0.0-1.0)  %


 


Immature Gran # (Auto)   0.01   (0.00-0.50)  10^3/uL


 


Neut # (Auto)   4.04   (2.50-7.00)  10^3/uL


 


Lymph # (Auto)   0.79 L   (1.00-4.00)  10^3/uL


 


Mono # (Auto)   0.38   (0.10-0.80)  10^3/uL


 


Eos # (Auto)   0.03 L   (0.10-0.30)  10^3/uL


 


Baso # (Auto)   0.02   (0.00-0.10)  10^3/uL


 


Sodium    150 H  (136-145)  mmol/L


 


Potassium    3.2 L  (3.3-5.3)  mmol/L


 


Chloride    108  ()  mmol/L


 


Carbon Dioxide    28.1  (21.0-32.0)  mmol/L


 


Anion Gap    17.1 H  (5-15)  mmol/L


 


BUN    20  (6-25)  mg/dL


 


Creatinine    0.91  (0.51-1.17)  mg/dL


 


Est Cr Clr Drug Dosing    71.06  mL/min


 


Estimated GFR (MDRD)    > 60  mL/min


 


Glucose    150 H (75 - 99) mg/dL


 


Calcium    8.1 L  (8.7-10.3)  mg/dL


 


Total Bilirubin    0.6  (0.2-1.0)  mg/dL


 


AST    86 H  (15-37)  U/L


 


ALT    34  (12-78)  U/L


 


Alkaline Phosphatase    110  ()  IU/L


 


Total Protein    6.7  (6.4-8.2)  g/dL


 


Albumin    3.05  (3.00-4.80)  g/dL


 


Specimen Type  Urincath    


 


Urine Color  Yellow    (YELLOW)  


 


Urine Appearance  Cloudy H    (CLEAR)  


 


Urine pH  6.0    (5.0-9.0)  


 


Ur Specific Gravity  >= 1.030    (1.005-1.030)  


 


Urine Protein  >=300 H    (NEGATIVE)  mg/dL


 


Urine Glucose (UA)  Negative    (NEGATIVE)  mg/dL


 


Urine Ketones  Negative    (NEGATIVE)  mg/dL


 


Urine Occult Blood  Large H    (NEGATIVE)  


 


Urine Nitrite  Negative    (NEGATIVE)  


 


Urine Bilirubin  Negative    (NEGATIVE)  


 


Urine Urobilinogen  0.2    (0.2-1.0)  E.U./dL


 


Ur Leukocyte Esterase  Negative    (NEGATIVE)  


 


Urine RBC  Semi-packed    (0-5)  /HPF


 


Urine WBC  0-5    (0-5)  /HPF


 


Ur Epithelial Cells  Few    /LPF


 


Urine Bacteria  Moderate H    (NONE TO FEW)  /HPF














- Re-Assessments/Exams


Free Text/Narrative Re-Assessment/Exam: 





03/29/19 00:15


Patient is afebrile, vital signs stable, patient denies any discomfort.  

Discussed findings with  from [facility] along with wife.  Patient 

will be transported back to nursing facility and follow-up at [facility].





Departure





- Departure


Time of Disposition: 00:17


Disposition: DC/Tfer to [facility]


Condition: Good


Clinical Impression: 


 Fall in elderly patient








- Discharge Information


Instructions:  Fall Prevention in the Home, Easy-to-Read


Referrals: 


Kayley Alvarado MD [Primary Care Provider] - 


Forms:  ED Department Discharge


Additional Instructions: 


Follow-up at [facility] in next 1-2 days.  Return to emergency department 

sooner if symptoms continue or worsen





- My Orders


Last 24 Hours: 


My Active Orders





03/28/19 22:46


EKG 12 Lead [EK] Routine 





03/28/19 22:47


EKG Documentation Completion [RC] ASDIRECTED 





03/28/19 23:22


INR,PT,PROTHROMBIN TIME [COAG] Stat 





03/29/19 00:11


CULTURE URINE [RM] Stat 














- Assessment/Plan


Last 24 Hours: 


My Active Orders





03/28/19 22:46


EKG 12 Lead [EK] Routine 





03/28/19 22:47


EKG Documentation Completion [RC] ASDIRECTED 





03/28/19 23:22


INR,PT,PROTHROMBIN TIME [COAG] Stat 





03/29/19 00:11


CULTURE URINE [RM] Stat 











Assessment:: 





Fall


Plan: 





Follow-up at [facility]

## 2019-03-29 LAB
ANION GAP SERPL CALC-SCNC: 17.1 MMOL/L (ref 5–15)
CHLORIDE SERPL-SCNC: 108 MMOL/L (ref 98–115)
SODIUM SERPL-SCNC: 150 MMOL/L (ref 136–145)

## 2019-04-01 ENCOUNTER — HOSPITAL ENCOUNTER (EMERGENCY)
Dept: HOSPITAL 77 - KA.ED | Age: 79
Discharge: NURSING, REHAB OR LONG-TERM CARE | End: 2019-04-01
Payer: MEDICARE

## 2019-04-01 VITALS — DIASTOLIC BLOOD PRESSURE: 73 MMHG | SYSTOLIC BLOOD PRESSURE: 136 MMHG

## 2019-04-01 DIAGNOSIS — I11.0: ICD-10-CM

## 2019-04-01 DIAGNOSIS — I48.91: ICD-10-CM

## 2019-04-01 DIAGNOSIS — Z88.8: ICD-10-CM

## 2019-04-01 DIAGNOSIS — F32.9: ICD-10-CM

## 2019-04-01 DIAGNOSIS — I50.9: ICD-10-CM

## 2019-04-01 DIAGNOSIS — Z79.899: ICD-10-CM

## 2019-04-01 DIAGNOSIS — F41.9: ICD-10-CM

## 2019-04-01 DIAGNOSIS — E11.9: ICD-10-CM

## 2019-04-01 DIAGNOSIS — Z88.5: ICD-10-CM

## 2019-04-01 DIAGNOSIS — K21.9: ICD-10-CM

## 2019-04-01 DIAGNOSIS — N30.01: Primary | ICD-10-CM

## 2019-04-01 DIAGNOSIS — Z79.01: ICD-10-CM

## 2019-04-01 LAB
ANION GAP SERPL CALC-SCNC: 15.8 MMOL/L (ref 5–15)
CHLORIDE SERPL-SCNC: 108 MMOL/L (ref 98–115)
SODIUM SERPL-SCNC: 148 MMOL/L (ref 136–145)

## 2019-04-01 NOTE — EDM.PDOC
ED HPI GENERAL MEDICAL PROBLEM





- General


Stated Complaint: FALL


Time Seen by Provider: 04/01/19 21:20


Source of Information: Reports: Patient, Family (wife)


History Limitations: Reports: No Limitations





- History of Present Illness


INITIAL COMMENTS - FREE TEXT/NARRATIVE: 





Patient presents from the NH via EMS with report of collapsing as he was being 

assisted from using the bathroom, to his bed.  He didn't fall but he was 

unresponsive for awhile.  When EMS arrived pt's HR was 30, BP was 81/20 and he 

wasn't remembering what happened.  They started oxygen at 10 L which brought 

sats up to 99%.  Pt now feels fine and without any pain but he doesn't remember 

what happened.  EMS tells me that patient was not alert when they arrived but 

he tried to resist them and take off his oxygen mask.  His sats were 82% and he 

wasn't speaking coherently.  Sats improved quickly on oxygen and he gradually 

became more alert and oriented en route.  Here in ER he is alert and speaking 

clearly to us.  





- Related Data


 Allergies











Allergy/AdvReac Type Severity Reaction Status Date / Time


 


omeprazole magnesium Allergy Intermediate Cannot Verified 04/01/19 22:39





[From Prilosec]   Remember  


 


cimetidine [From Tagamet] Allergy  Rash Verified 04/01/19 22:39


 


cimetidine HCl [From Tagamet] Allergy  Rash Verified 04/01/19 22:39


 


codeine Allergy  Hallucinati Verified 04/01/19 22:39





   ons  


 


omeprazole [From Prilosec] Allergy  Cannot Verified 04/01/19 22:39





   Remember  











Home Meds: 


 Home Meds





busPIRone [Buspar] 10 mg PO BIDMEALS 12/06/17 [History]


chlordiazePOXIDE [Librium] 10 mg PO BIDMEALS 12/06/17 [History]


Acetaminophen [Tylenol Arthritis] 650 mg PO BID 05/20/18 [History]


Allopurinol [Zyloprim] 300 mg PO DAILY 05/21/18 [History]


Esomeprazole [NexIUM] 40 mg PO ACBREAKFAST 05/21/18 [History]


metFORMIN HCl [Metformin HCl] 1,000 mg PO BIDMEALS 05/21/18 [History]


Tamsulosin [Flomax] 0.4 mg PO QAM 01/10/19 [History]


Terazosin [Hytrin] 5 mg PO DAILY 01/10/19 [History]


atorvaSTATin [Lipitor] 40 mg PO BEDTIME 01/10/19 [History]


Lisinopril 20 mg PO DAILY #30 tab 01/13/19 [Rx]


Warfarin [Coumadin] 7.5 mg PO DAILY@1800 03/28/19 [History]


Furosemide [Lasix] 20 mg PO DAILY 04/01/19 [History]


risperiDONE 0.25 mg PO 2000 04/01/19 [History]











Past Medical History


HEENT History: Reports: Cataract, Hard of Hearing, Impaired Vision


Cardiovascular History: Reports: Afib, Heart Failure, High Cholesterol, 

Hypertension, MI


Respiratory History: Reports: Pneumonia, Recurrent


Gastrointestinal History: Reports: Chronic Constipation, GERD, Hemorrhoids


Genitourinary History: Reports: BPH


Musculoskeletal History: Reports: Arthritis, Back Pain, Chronic


Other Musculoskeletal History: chronic knee pain


Neurological History: Reports: TIA


Other Neuro History: this admit


Psychiatric History: Reports: Anxiety, Dementia, Depression, Mood Swings


Endocrine/Metabolic History: Reports: Diabetes, Type II, Obesity/BMI 30+


Hematologic History: Reports: None


Immunologic History: Reports: None


Oncologic (Cancer) History: Reports: None


Dermatologic History: Reports: Other (See Below)


Other Dermatologic History: scattered scabs, picks/scratches at skin





- Infectious Disease History


Infectious Disease History: Reports: Measles





- Past Surgical History


HEENT Surgical History: Reports: Cataract Surgery, Oral Surgery


Respiratory Surgical History: Reports: None


GI Surgical History: Reports: Cholecystectomy, Colonoscopy, EGD, Hernia, 

Abdominal


Oncologic Surgical History: Reports: None


Dermatological Surgical History: Reports: None





Social & Family History





- Family History


Family Medical History: Noncontributory


HEENT: Reports: None


Cardiac: Reports: Hypertension, MI


Respiratory: Reports: None


GI: Reports: None


: Reports: None


OBGYN: Reports: None


Musculoskeletal: Reports: None


Neurological: Reports: None


Psychiatric: Reports: None


Endocrine/Metabolic: Reports: None


Hematologic: Reports: None


Immunologic: Reports: None


Dermatologic: Reports: None


Oncologic: Reports: Prostate





- Caffeine Use


Caffeine Use: Reports: Coffee





- Living Situation & Occupation


Living situation: Reports:  (he has dementia with self care deficit.  

His wife provides cares for him at home.  Wanting NH placement)


Occupation: Retired





ED ROS GENERAL





- Review of Systems


Review Of Systems: See Below


Constitutional: Denies: Fever, Malaise


HEENT: Denies: Ear Pain, Vision Change


Respiratory: Denies: Shortness of Breath, Cough


Cardiovascular: Reports: Syncope


Endocrine: Reports: No Symptoms


GI/Abdominal: Denies: Abdominal Pain, Diarrhea, Nausea, Vomiting


: Denies: Dysuria, Flank Pain


Musculoskeletal: Denies: Neck Pain, Shoulder Pain, Arm Pain, Back Pain, Hand 

Pain, Leg Pain, Foot Pain


Skin: Denies: Cyanosis, Jaundice, Mottled, Pallor, Diaphoresis


Neurological: Reports: Syncope.  Denies: Confusion, Dizziness, Seizure


Psychiatric: Denies: Agitation, Anxiety, Confusion





- Physical Exam


Exam: See Below


Exam Limited By: No Limitations


General Appearance: Alert, WD/WN, No Apparent Distress


Eye Exam: Bilateral Eye: EOMI, Normal Inspection, PERRL


Ears: Normal External Exam, Hearing Grossly Normal


Nose: Normal Inspection, No Blood


Throat/Mouth: Normal Inspection, Normal Lips, Normal Voice, No Airway Compromise


Head Exam: Atraumatic, Normocephalic


Neck: Normal Inspection, Supple, Non-Tender, Full Range of Motion


Respiratory/Chest: No Respiratory Distress, Decreased Breath Sounds (distant, 

so will get CXR), Other (He has a 6x8 cm patch of ecchymosis on central upper 

chest from a fall he sustained 3 days ago; was evaluated here in ER at that 

time.).  No: Crackles, Rales, Rhonchi, Wheezing, Stridor


Cardiovascular: Regular Rate, Rhythm, No Murmur


GI/Abdominal: Normal Bowel Sounds, Soft, Non-Tender, No Organomegaly


Neuro Exam (Abbreviated): Alert, Oriented, Normal Cognition, No Motor/Sensory 

Deficits


Extremities: Normal Inspection, Normal Range of Motion, Non-Tender, Pedal Edema 

(wears TEDs)


Psychiatric: Normal Affect, Normal Mood


Skin Exam: Warm, Dry, Intact, Normal Color, Other (excoriations of abdomen)





Course





- Vital Signs


Last Recorded V/S: 


 Last Vital Signs











Temp  98.0 F   04/01/19 21:10


 


Pulse  55 L  04/01/19 22:15


 


Resp  18   04/01/19 22:15


 


BP  136/52 L  04/01/19 22:15


 


Pulse Ox  97   04/01/19 22:15














- Orders/Labs/Meds


Orders: 


 Active Orders 24 hr











 Category Date Time Status


 


 Oxygen Therapy [RC] ASDIRECTED Care  04/01/19 22:42 Active


 


 CXR [Chest 1V Frontal] [CR] Stat Exams  04/01/19 21:32 Ordered


 


 CULTURE URINE [RM] Stat Lab  04/01/19 22:43 Ordered











Labs: 


 Laboratory Tests











  04/01/19 04/01/19 04/01/19 Range/Units





  21:40 21:40 22:15 


 


WBC  4.30 L    (5.00-10.00)  10^3/uL


 


RBC  3.95 L    (4.50-6.00)  10^6/uL


 


Hgb  11.7 L    (13.0-17.0)  g/dL


 


Hct  37.2 L    (40.0-52.0)  %


 


MCV  94.2 H    (82.0-92.0)  fL


 


MCH  29.6    (27.0-31.0)  pg


 


MCHC  31.5 L    (32.0-36.0)  g/dL


 


RDW  15.0 H    (11.5-14.5)  %


 


Plt Count  124 L    (150-400)  10^3/uL


 


MPV  10.9 H    (7.4-10.4)  fL


 


Immature Gran % (Auto)  0.2    (0.0-5.0)  %


 


Neut % (Auto)  66.3    (50.0-70.0)  %


 


Lymph % (Auto)  20.2    (20.0-40.0)  %


 


Mono % (Auto)  7.9    (2.0-8.0)  %


 


Eos % (Auto)  4.9 H    (1.0-3.0)  %


 


Baso % (Auto)  0.5    (0.0-1.0)  %


 


Immature Gran # (Auto)  0.01    (0.00-0.50)  10^3/uL


 


Neut # (Auto)  2.85    (2.50-7.00)  10^3/uL


 


Lymph # (Auto)  0.87 L    (1.00-4.00)  10^3/uL


 


Mono # (Auto)  0.34    (0.10-0.80)  10^3/uL


 


Eos # (Auto)  0.21    (0.10-0.30)  10^3/uL


 


Baso # (Auto)  0.02    (0.00-0.10)  10^3/uL


 


Sodium   148 H   (136-145)  mmol/L


 


Potassium   3.1 L   (3.3-5.3)  mmol/L


 


Chloride   108   ()  mmol/L


 


Carbon Dioxide   27.3   (21.0-32.0)  mmol/L


 


Anion Gap   15.8 H   (5-15)  mmol/L


 


BUN   24   (6-25)  mg/dL


 


Creatinine   1.01   (0.51-1.17)  mg/dL


 


Est Cr Clr Drug Dosing   TNP   


 


Estimated GFR (MDRD)   > 60   mL/min


 


Glucose   166 H  (75 - 99) mg/dL


 


Calcium   8.0 L   (8.7-10.3)  mg/dL


 


Specimen Type    Urinqcath  


 


Urine Color    Yellow  (YELLOW)  


 


Urine Appearance    Slightly cloudy H  (CLEAR)  


 


Urine pH    5.5  (5.0-9.0)  


 


Ur Specific Gravity    >= 1.030  (1.005-1.030)  


 


Urine Protein    >=300 H  (NEGATIVE)  mg/dL


 


Urine Glucose (UA)    Negative  (NEGATIVE)  mg/dL


 


Urine Ketones    Trace H  (NEGATIVE)  mg/dL


 


Urine Occult Blood    Moderate H  (NEGATIVE)  


 


Urine Nitrite    Negative  (NEGATIVE)  


 


Urine Bilirubin    Small H  (NEGATIVE)  


 


Urine Urobilinogen    1.0  (0.2-1.0)  E.U./dL


 


Ur Leukocyte Esterase    Negative  (NEGATIVE)  


 


Urine RBC    >100 H  (0-5)  /HPF


 


Urine WBC    0-5  (0-5)  /HPF


 


Ur Epithelial Cells    Few  /LPF


 


Amorphous Sediment    Few  (0/HPF)  /HPF


 


Urine Bacteria    Moderate H  (NONE TO FEW)  /HPF











Meds: 


Medications














Discontinued Medications














Generic Name Dose Route Start Last Admin





  Trade Name Freq  PRN Reason Stop Dose Admin


 


Sodium Chloride  1,000 mls @ 999 mls/hr  04/01/19 21:36  04/01/19 21:50





  Normal Saline  IV  04/01/19 22:36  999 mls/hr





  .BOLUS ONE   Administration





     





     





     





     


 


Nitrofurantoin Macrocrystals  100 mg  04/01/19 22:47  





  Macrodantin  PO  04/01/19 22:48  





  ONETIME ONE   





     





     





     





     














- Re-Assessments/Exams


Free Text/Narrative Re-Assessment/Exam: 





04/01/19 21:52


EKG shows no ST changes and is unchanged from 3 days ago.  Patient is feeling 

well and talking clearly and coherently.


04/01/19 23:02


CBC and CMP are okay.  Cath UA shows significant bacteriuria, proteinuria and 

hematuria.  Will culture and treat empirically with Nitrofurantoin.  Wanted to 

treat with either Cipro or Bactrim but there were significant contraindications 

with other meds.  Discussed findings and treatment plan with patient and his 

wife.  Started antibiotic in ER.  Patient discharged to home in stable 

condition.





Departure





- Departure


Time of Disposition: 22:59


Disposition: DC/Tfer to [facility] 03


Condition: Fair


Clinical Impression: 


 Acute cystitis with hematuria








- Discharge Information


Instructions:  Dehydration, Adult, Easy-to-Read, Urinary Tract Infection, Adult


Referrals: 


Kayley Alvarado MD [Primary Care Provider] - 


Additional Instructions: 


1. Drink 6-8 cups of water daily.


2. Take the Nitrofurantoin as directed.


3. Follow up with your PCP in a week for recheck or sooner if worsening.





- My Orders


Last 24 Hours: 


My Active Orders





04/01/19 21:32


CXR [Chest 1V Frontal] [CR] Stat 





04/01/19 22:42


Oxygen Therapy [RC] ASDIRECTED 





04/01/19 22:43


CULTURE URINE [RM] Stat 














- Assessment/Plan


Last 24 Hours: 


My Active Orders





04/01/19 21:32


CXR [Chest 1V Frontal] [CR] Stat 





04/01/19 22:42


Oxygen Therapy [RC] ASDIRECTED 





04/01/19 22:43


CULTURE URINE [RM] Stat

## 2019-04-02 NOTE — CR
______________________________________________________________________________   

  

[id number] RAD/RAD Chest PA or AP 1V  

EXAM: FRONTAL CHEST  

   

 INDICATION: Syncope.  

   

 COMPARISON: February 9, 2019.  

   

 DISCUSSION: Increased opacification of the right lung base likely a combination  

 of volume loss, pleural fluid and infiltrates. Stable mild atelectasis on the  

 left. Stable cardiomegaly with borderline vascular congestion. Follow-up imaging  

 with standing PA and lateral views may be useful.  

   

 IMPRESSION:    

 1.  Increased right base opacification likely combination of pleural fluid,  

 atelectasis and infiltrates.  

 2.  Cardiomegaly with mild central vascular congestion.  

   

 Electronically signed by Jb De La Cruz MD on 4/2/2019 8:03 AM  

   

  

Jb De La Cruz MD                 

 04/02/19 0805    

  

Thank you for allowing us to participate in the care of your patient.

## 2019-04-04 ENCOUNTER — HOSPITAL ENCOUNTER (INPATIENT)
Dept: HOSPITAL 77 - KA.ED | Age: 79
LOS: 4 days | Discharge: NURSING, REHAB OR LONG-TERM CARE | DRG: 189 | End: 2019-04-08
Attending: FAMILY MEDICINE | Admitting: NURSE PRACTITIONER
Payer: MEDICARE

## 2019-04-04 DIAGNOSIS — K21.9: ICD-10-CM

## 2019-04-04 DIAGNOSIS — R16.1: ICD-10-CM

## 2019-04-04 DIAGNOSIS — R18.8: ICD-10-CM

## 2019-04-04 DIAGNOSIS — M19.91: ICD-10-CM

## 2019-04-04 DIAGNOSIS — G89.29: ICD-10-CM

## 2019-04-04 DIAGNOSIS — E78.1: ICD-10-CM

## 2019-04-04 DIAGNOSIS — Z90.49: ICD-10-CM

## 2019-04-04 DIAGNOSIS — I25.2: ICD-10-CM

## 2019-04-04 DIAGNOSIS — H91.90: ICD-10-CM

## 2019-04-04 DIAGNOSIS — M54.9: ICD-10-CM

## 2019-04-04 DIAGNOSIS — J96.01: Primary | ICD-10-CM

## 2019-04-04 DIAGNOSIS — E78.00: ICD-10-CM

## 2019-04-04 DIAGNOSIS — M10.9: ICD-10-CM

## 2019-04-04 DIAGNOSIS — E07.89: ICD-10-CM

## 2019-04-04 DIAGNOSIS — R31.9: ICD-10-CM

## 2019-04-04 DIAGNOSIS — F32.9: ICD-10-CM

## 2019-04-04 DIAGNOSIS — R58: ICD-10-CM

## 2019-04-04 DIAGNOSIS — F03.90: ICD-10-CM

## 2019-04-04 DIAGNOSIS — I50.30: ICD-10-CM

## 2019-04-04 DIAGNOSIS — Z87.891: ICD-10-CM

## 2019-04-04 DIAGNOSIS — Z79.84: ICD-10-CM

## 2019-04-04 DIAGNOSIS — N40.0: ICD-10-CM

## 2019-04-04 DIAGNOSIS — J98.11: ICD-10-CM

## 2019-04-04 DIAGNOSIS — I11.0: ICD-10-CM

## 2019-04-04 DIAGNOSIS — Z79.899: ICD-10-CM

## 2019-04-04 DIAGNOSIS — K59.09: ICD-10-CM

## 2019-04-04 DIAGNOSIS — E66.01: ICD-10-CM

## 2019-04-04 DIAGNOSIS — Z86.73: ICD-10-CM

## 2019-04-04 DIAGNOSIS — Z85.46: ICD-10-CM

## 2019-04-04 DIAGNOSIS — D64.9: ICD-10-CM

## 2019-04-04 DIAGNOSIS — I48.92: ICD-10-CM

## 2019-04-04 DIAGNOSIS — Z88.6: ICD-10-CM

## 2019-04-04 DIAGNOSIS — F41.9: ICD-10-CM

## 2019-04-04 DIAGNOSIS — E88.09: ICD-10-CM

## 2019-04-04 DIAGNOSIS — I50.9: ICD-10-CM

## 2019-04-04 DIAGNOSIS — H54.7: ICD-10-CM

## 2019-04-04 DIAGNOSIS — I48.2: ICD-10-CM

## 2019-04-04 DIAGNOSIS — Z88.8: ICD-10-CM

## 2019-04-04 DIAGNOSIS — E11.9: ICD-10-CM

## 2019-04-04 DIAGNOSIS — Z79.01: ICD-10-CM

## 2019-04-04 DIAGNOSIS — B36.9: ICD-10-CM

## 2019-04-04 LAB
ANION GAP SERPL CALC-SCNC: 12 MMOL/L (ref 5–15)
CHLORIDE SERPL-SCNC: 112 MMOL/L (ref 98–115)
SODIUM SERPL-SCNC: 148 MMOL/L (ref 136–145)

## 2019-04-04 PROCEDURE — G0378 HOSPITAL OBSERVATION PER HR: HCPCS

## 2019-04-04 NOTE — CT
______________________________________________________________________________   

  

[id number] CT/CT Chest WO IV  

EXAM: CT Chest WO IV  

   

 CLINICAL DATA: ATELECTASIS EFFUSIONS.  

   

 COMPARISON: Multiple priors, most recent is a chest radiograph from today.  

   

 FINDINGS:   

   

 LUNGS: As seen on radiograph from today, there is mild amount of fluid retention  

 the chest, with small bilateral effusions right greater than left.  

   

 Additionally, there is bibasal right greater than left parenchymal opacities,  

 also seen on the prior examination.  

   

 Findings include subsegmental atelectasis in the lower and middle lobes on the  

 right. This accounts for appearance on radiograph.  

   

 Some of this right lung base parenchymal opacification is somewhat nodular in  

 appearance, which is nonspecific but could represent superimposed pneumonia if  

 there are signs of infection. In the absence of infection clinically, this is  

 likely atelectasis as well.  

   

 Remainder of the lung parenchyma is unremarkable.  

   

 HEART AND GREAT VESSELS: Cardiomegaly and central vascular congestion, as seen  

 on radiograph from today.  

   

 MEDIASTINUM AND LYMPHATICS: No mediastinal or hilar lymphadenopathy.  

 Incidentally noted is a 43 x 31 x 49 mm mass arising from the right thyroid  

 lobe.  

   

 Diffuse circumferential esophageal wall thickening, most prominent in its distal  

 aspect extending to the gastroesophageal junction.  

   

 UPPER ABDOMINAL ORGANS: Mild amount of ascites in the upper abdomen.  

 Splenomegaly.  

   

 BONES: Scattered changes of spondylosis in the spine. No fracture or osseous  

 lesion.  

   

 IMPRESSION:  

   

 Sequela of fluid retention the chest, as seen on radiograph from today.  

   

 In addition, there is right greater than left bibasal atelectasis. Appearance is  

 consistent with subsegmental/compressive atelectasis.  

   

 Correlate for signs of infection, as some of this right lung base parenchymal  

 opacification is somewhat nodular in appearance, which is not typical of  

 atelectasis.  

   

 Fluid retention in the soft tissues and abdomen as well, as there is ascites.  

   

 In addition there is splenomegaly.  

   

 Correlate with LFTs to exclude underlying hepatocellular process as well.  

   

 Right thyroid mass. Consider dedicated thyroid ultrasound to better characterize  

 this at [facility].  

   

 Electronically signed by Alex Rodriguez MD on 4/4/2019 6:13 PM  

   

  

Alex Rodriguez MD                 

 04/04/19 1818    

  

Thank you for allowing us to participate in the care of your patient.

## 2019-04-04 NOTE — EDM.PDOC
ED HPI GENERAL MEDICAL PROBLEM





- General


Chief Complaint: General


Stated Complaint: BLADDER INF/FLUID IN LEGS/CHF?


Time Seen by Provider: 04/04/19 14:55


Source of Information: Reports: Patient, Family


History Limitations: Reports: No Limitations





- History of Present Illness


INITIAL COMMENTS - FREE TEXT/NARRATIVE: 





Was Seen today at [facility] in [place] by "Lisa" who conferred with Mark Esquivel 

for discussion and treatment.


Initially refused to be seen/leave the [facility] for hospital consult.  Finally 

several hours later agreed to be seen for evaluation.





Increased edema to legs with questionable respiratory worsening from baseline.  

Is now on supplemental O2





Denies any cardiac related pain or symptoms.  Acknowledges salt intake, and 

other irritants to CHF/edema.


Onset: Today


Onset Date: 04/04/19


Onset Time: 10:00


Duration: Day(s):


Location: Reports: Chest, Lower Extremity, Left, Lower Extremity, Right


Quality: Reports: Pressure


Severity: Moderate


Improves with: Reports: None


Worsens with: Reports: None


Associated Symptoms: Reports: No Other Symptoms





- Related Data


 Allergies











Allergy/AdvReac Type Severity Reaction Status Date / Time


 


omeprazole magnesium Allergy Intermediate Cannot Verified 04/04/19 15:15





[From Prilosec]   Remember  


 


cimetidine [From Tagamet] Allergy  Rash Verified 04/04/19 15:15


 


cimetidine HCl [From Tagamet] Allergy  Rash Verified 04/04/19 15:15


 


codeine Allergy  Hallucinati Verified 04/04/19 15:15





   ons  


 


omeprazole [From Prilosec] Allergy  Cannot Verified 04/04/19 15:15





   Remember  











Home Meds: 


 Home Meds





busPIRone [Buspar] 10 mg PO 0800,2000 12/06/17 [History]


chlordiazePOXIDE [Librium] 10 mg PO 0800,1800 12/06/17 [History]


Acetaminophen [Tylenol Arthritis] 650 mg PO 0800,1800 PRN 05/20/18 [History]


Allopurinol [Zyloprim] 300 mg PO DAILY 05/21/18 [History]


Esomeprazole [NexIUM] 40 mg PO 0730 05/21/18 [History]


metFORMIN HCl [Metformin HCl] 1,000 mg PO 0800,1800 05/21/18 [History]


Tamsulosin [Flomax] 0.4 mg PO 0900 01/10/19 [History]


Terazosin [Hytrin] 5 mg PO 0900 01/10/19 [History]


atorvaSTATin [Lipitor] 40 mg PO BEDTIME 01/10/19 [History]


Lisinopril 20 mg PO DAILY #30 tab 01/13/19 [Rx]


Warfarin [Coumadin] 7.5 mg PO SUMOTUWETHSA 03/28/19 [History]


Furosemide [Lasix] 20 mg PO DAILY 04/01/19 [History]


risperiDONE 0.25 mg PO 2000 04/01/19 [History]


* Zinc Oxide 10% 1 spray TOP BID PRN 04/04/19 [History]


Acetaminophen [Tylenol Arthritis Pain] 650 mg PO Q8HR PRN 04/04/19 [History]


Bacitracin/Neomycin/Polymyxin [Neosporin Oint] 1 applic TOP DAILY 04/04/19 [

History]


Nitrofurantoin Mono/Macrocryst [Nitrofurantoin Mono-MCR] 100 mg PO BID 04/04/19 

[History]


Warfarin [Coumadin] 5 mg PO FR 04/04/19 [History]


traMADol HCl [Tramadol HCl] 50 mg PO BEDTIME PRN 04/04/19 [History]











Past Medical History


HEENT History: Reports: Cataract, Hard of Hearing, Impaired Vision


Cardiovascular History: Reports: Afib, Heart Failure, High Cholesterol, 

Hypertension, MI


Respiratory History: Reports: Pneumonia, Recurrent, Other (See Below) (ASBESTOS 

exposre likely.  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


Gastrointestinal History: Reports: Chronic Constipation, GERD, Hemorrhoids


Genitourinary History: Reports: BPH


Musculoskeletal History: Reports: Arthritis, Back Pain, Chronic


Other Musculoskeletal History: chronic knee pain


Neurological History: Reports: TIA


Other Neuro History: this admit


Psychiatric History: Reports: Anxiety, Dementia, Depression, Mood Swings


Endocrine/Metabolic History: Reports: Diabetes, Type II, Obesity/BMI 30+


Hematologic History: Reports: None


Immunologic History: Reports: None


Oncologic (Cancer) History: Reports: None


Dermatologic History: Reports: Other (See Below)


Other Dermatologic History: scattered scabs, picks/scratches at skin





- Infectious Disease History


Infectious Disease History: Reports: Measles





- Past Surgical History


HEENT Surgical History: Reports: Cataract Surgery, Oral Surgery


Respiratory Surgical History: Reports: None


GI Surgical History: Reports: Cholecystectomy, Colonoscopy, EGD, Hernia, 

Abdominal


Oncologic Surgical History: Reports: None


Dermatological Surgical History: Reports: None





Social & Family History





- Family History


Family Medical History: Noncontributory


HEENT: Reports: None


Cardiac: Reports: Hypertension, MI


Respiratory: Reports: None


GI: Reports: None


: Reports: None


OBGYN: Reports: None


Musculoskeletal: Reports: None


Neurological: Reports: None


Psychiatric: Reports: None


Endocrine/Metabolic: Reports: None


Hematologic: Reports: None


Immunologic: Reports: None


Dermatologic: Reports: None


Oncologic: Reports: Prostate





- Tobacco Use


Smoking Status *Q: Former Smoker





- Caffeine Use


Caffeine Use: Reports: Coffee





- Alcohol Use


Alcohol Use History: Yes


Date of Last Drink: 01/01/80





- Living Situation & Occupation


Living situation: Reports:  (he has dementia with self care deficit.  

His wife provides cares for him at home.  Wanting NH placement)


Occupation: Retired





ED ROS GENERAL





- Review of Systems


Review Of Systems: See Below


Constitutional: Reports: Weakness, Weight Gain


HEENT: Reports: No Symptoms


Respiratory: Reports: Shortness of Breath


Cardiovascular: Reports: Orthopnea.  Denies: Chest Pain


Endocrine: Reports: No Symptoms


GI/Abdominal: Reports: No Symptoms


: Reports: No Symptoms


Skin: Reports: Bruising, Rash


Neurological: Reports: No Symptoms


Psychiatric: Reports: Agitation, Anxiety


Hematologic/Lymphatic: Reports: Easy Bruising


Immunologic: Reports: No Symptoms





ED EXAM, GENERAL





- Physical Exam


Exam: See Below


Exam Limited By: No Limitations


General Appearance: Alert, WD/WN, No Apparent Distress


Ears: Normal External Exam, Hearing Grossly Normal


Nose: Normal Inspection, Normal Mucosa, No Blood


Throat/Mouth: Normal Inspection, Normal Lips, Normal Oropharynx, Normal Voice


Head: Atraumatic, Normocephalic


Neck: Normal Inspection, Supple, Non-Tender, Limited Range of Motion


Respiratory/Chest: No Respiratory Distress, Lungs Clear, No Accessory Muscle Use

, Decreased Breath Sounds, Rhonchi


Cardiovascular: Irregularly Irregular, Other (Significant edema to bilateral 

legs/feet.  pitting in nature)


GI/Abdominal: Normal Bowel Sounds, Distended.  No: Tender


 (Male) Exam: Deferred


Rectal (Males) Exam: Deferred


Back Exam: Normal Inspection.  No: CVA Tenderness (L), CVA Tenderness (R), 

Paraspinal Tenderness, Vertebral Tenderness


Extremities: Pedal Edema, Other (ecchymosis to legs and specifically Rt. Foot 

with no pain to motion)


Neurological: Alert, Oriented, CN II-XII Intact, Normal Cognition


Psychiatric: Normal Affect, Normal Mood


Skin Exam: Warm, Dry.  No: Intact (scattered areas of "picking/scratching"  

blisters from fluid load leaking to lower legs Rt>Lt.  Scrotum irritated rash.)





EKG INTERPRETATION


EKG Date: 04/04/19


Time: 19:00


Rhythm: A-Fib


P-Wave: Variable


QRS: Normal


Comparison: NA - No Prior EKG





Course





- Vital Signs


Last Recorded V/S: 


 Last Vital Signs











Temp  36.9 C   04/05/19 07:00


 


Pulse  50 L  04/05/19 07:00


 


Resp  32 H  04/05/19 07:00


 


BP  130/76   04/05/19 08:09


 


Pulse Ox  91 L  04/05/19 07:00














- Orders/Labs/Meds


Orders: 


 Active Orders 24 hr











 Category Date Time Status


 


 Insert Hitchcock Catheter [Insert Urinary Catheter] [OM.PC] Care  04/04/19 15:30 

Ordered





 Q24H   


 


 Urinary Catheter Assessment [RC] 0900,2100 Care  04/04/19 15:22 Active


 


 Nystatin [Nystatin Ointment] Med  04/04/19 21:00 Active





 0.25 gm TOP QID   


 


 EKG 12 Lead [EK] Routine Ther  04/04/19 15:20 Ordered








 Medication Orders





Acetaminophen (Tylenol Arthritis Pain)  650 mg PO 0800,1800 PRN


   PRN Reason: Pain


Acetaminophen (Tylenol Arthritis Pain)  650 mg PO Q8HR PRN


   PRN Reason: Pain


Albuterol/Ipratropium (Duoneb 3.0-0.5 Mg/3 Ml)  3 ml NEB Q6HRRT PRN


   PRN Reason: Shortness of Breath


Allopurinol (Zyloprim)  300 mg PO DAILY [facility]


   Last Admin: 04/05/19 08:06  Dose: 300 mg


Atorvastatin Calcium (Lipitor)  40 mg PO BEDTIME [facility]


Buspirone HCl (Buspar)  10 mg PO BID@0800,2000 [facility]


   Last Admin: 04/05/19 08:05  Dose: 10 mg


Chlordiazepoxide HCl (Librium)  10 mg PO 0800,1800 [facility]


   Last Admin: 04/05/19 08:07  Dose: 10 mg


Esomeprazole Magnesium (Nexium)  40 mg PO 0730 [facility]


Furosemide (Lasix)  20 mg PO DAILY [facility]


Lisinopril (Prinivil)  20 mg PO DAILY [facility]


   Last Admin: 04/05/19 08:09  Dose: 20 mg


Metformin HCl (Glucophage)  1,000 mg PO BID@0800,1800 [facility]


   Last Admin: 04/05/19 08:05  Dose: 1,000 mg


Neomycin/Polymyxin/Bacitracin (Triple Antibiotic Oint)  0 gm TOP DAILY [facility]


   Last Admin: 04/05/19 08:08  Dose: 1 applic


Nitrofurantoin Macrocrystals (Macrobid)  100 mg PO BID [facility]


   Last Admin: 04/05/19 08:06  Dose: 100 mg


Non-Formulary Medication (* Zinc Oxide 10%)  1 spray TOP BID PRN


   PRN Reason: redness to buttocks scrotum gr


Nystatin (Nystatin Ointment)  0.25 gm TOP QID [facility]


   Last Admin: 04/04/19 20:17  Dose: 1 applic


Risperidone (Risperidal)  0.25 mg PO 2000 [facility]


Tamsulosin HCl (Flomax)  0.4 mg PO 0900 [facility]


   Last Admin: 04/05/19 08:07  Dose: 0.4 mg


Terazosin HCl (Hytrin)  5 mg PO 0900 [facility]


   Last Admin: 04/05/19 08:09  Dose: 5 mg


Tramadol HCl (Ultram)  50 mg PO BEDTIME PRN


   PRN Reason: knee pain


Warfarin Sodium (Coumadin)  5 mg PO Fr@1800 [facility]


Warfarin Sodium (Coumadin)  7.5 mg PO SuMoTuWeThSa@1800 [facility]


Warfarin Sodium (Pharmacy To Dose - Warfarin)  1 dose .XX ASDIRECTED [facility]








Labs: 


 Laboratory Tests











  04/04/19 04/04/19 04/04/19 Range/Units





  15:40 15:40 15:40 


 


WBC  6.10    (5.00-10.00)  10^3/uL


 


RBC  4.05 L    (4.50-6.00)  10^6/uL


 


Hgb  11.7 L    (13.0-17.0)  g/dL


 


Hct  38.6 L    (40.0-52.0)  %


 


MCV  95.3 H    (82.0-92.0)  fL


 


MCH  28.9    (27.0-31.0)  pg


 


MCHC  30.3 L    (32.0-36.0)  g/dL


 


RDW  15.0 H    (11.5-14.5)  %


 


Plt Count  139 L    (150-400)  10^3/uL


 


MPV  10.2    (7.4-10.4)  fL


 


Immature Gran % (Auto)  0.2    (0.0-5.0)  %


 


Neut % (Auto)  67.2    (50.0-70.0)  %


 


Lymph % (Auto)  20.0    (20.0-40.0)  %


 


Mono % (Auto)  9.2 H    (2.0-8.0)  %


 


Eos % (Auto)  3.1 H    (1.0-3.0)  %


 


Baso % (Auto)  0.3    (0.0-1.0)  %


 


Immature Gran # (Auto)  0.01    (0.00-0.50)  10^3/uL


 


Neut # (Auto)  4.10    (2.50-7.00)  10^3/uL


 


Lymph # (Auto)  1.22    (1.00-4.00)  10^3/uL


 


Mono # (Auto)  0.56    (0.10-0.80)  10^3/uL


 


Eos # (Auto)  0.19    (0.10-0.30)  10^3/uL


 


Baso # (Auto)  0.02    (0.00-0.10)  10^3/uL


 


PT    29.0 H D  (8.9-11.4)  SEC


 


INR    2.9 H  (0.9-1.1)  


 


Sodium   148 H   (136-145)  mmol/L


 


Potassium   3.6   (3.3-5.3)  mmol/L


 


Chloride   112   ()  mmol/L


 


Carbon Dioxide   27.6   (21.0-32.0)  mmol/L


 


Anion Gap   12.0   (5-15)  mmol/L


 


BUN   21   (6-25)  mg/dL


 


Creatinine   1.00   (0.51-1.17)  mg/dL


 


Est Cr Clr Drug Dosing   64.67   mL/min


 


Estimated GFR (MDRD)   > 60   mL/min


 


Glucose   125 H  (75 - 99) mg/dL


 


Calcium   8.1 L   (8.7-10.3)  mg/dL


 


Total Bilirubin   0.8   (0.2-1.0)  mg/dL


 


AST   63 H   (15-37)  U/L


 


ALT   24   (12-78)  U/L


 


Alkaline Phosphatase   105   ()  IU/L


 


Creatine Kinase   53   ()  U/L


 


CK-MB (CK-2)   0.70   (0.00-4.30)  ng/mL


 


Troponin I   0.07   (0.00-0.070)  ng/mL


 


C-Reactive Protein   1.9 H   (0.0-0.9)  mg/dL


 


B-Natriuretic Peptide   7   (0-100)  pg/mL


 


Total Protein   6.5   (6.4-8.2)  g/dL


 


Albumin   2.82 L   (3.00-4.80)  g/dL


 


Specimen Type     


 


Urine Color     (YELLOW)  


 


Urine Appearance     (CLEAR)  


 


Urine pH     (5.0-9.0)  


 


Ur Specific Gravity     (1.005-1.030)  


 


Urine Protein     (NEGATIVE)  mg/dL


 


Urine Glucose (UA)     (NEGATIVE)  mg/dL


 


Urine Ketones     (NEGATIVE)  mg/dL


 


Urine Occult Blood     (NEGATIVE)  


 


Urine Nitrite     (NEGATIVE)  


 


Urine Bilirubin     (NEGATIVE)  


 


Urine Urobilinogen     (0.2-1.0)  E.U./dL


 


Ur Leukocyte Esterase     (NEGATIVE)  


 


Urine RBC     (0-5)  /HPF


 


Urine WBC     (0-5)  /HPF


 


Ur Epithelial Cells     /LPF


 


Urine Bacteria     (NONE TO FEW)  /HPF














  04/04/19 Range/Units





  17:02 


 


WBC   (5.00-10.00)  10^3/uL


 


RBC   (4.50-6.00)  10^6/uL


 


Hgb   (13.0-17.0)  g/dL


 


Hct   (40.0-52.0)  %


 


MCV   (82.0-92.0)  fL


 


MCH   (27.0-31.0)  pg


 


MCHC   (32.0-36.0)  g/dL


 


RDW   (11.5-14.5)  %


 


Plt Count   (150-400)  10^3/uL


 


MPV   (7.4-10.4)  fL


 


Immature Gran % (Auto)   (0.0-5.0)  %


 


Neut % (Auto)   (50.0-70.0)  %


 


Lymph % (Auto)   (20.0-40.0)  %


 


Mono % (Auto)   (2.0-8.0)  %


 


Eos % (Auto)   (1.0-3.0)  %


 


Baso % (Auto)   (0.0-1.0)  %


 


Immature Gran # (Auto)   (0.00-0.50)  10^3/uL


 


Neut # (Auto)   (2.50-7.00)  10^3/uL


 


Lymph # (Auto)   (1.00-4.00)  10^3/uL


 


Mono # (Auto)   (0.10-0.80)  10^3/uL


 


Eos # (Auto)   (0.10-0.30)  10^3/uL


 


Baso # (Auto)   (0.00-0.10)  10^3/uL


 


PT   (8.9-11.4)  SEC


 


INR   (0.9-1.1)  


 


Sodium   (136-145)  mmol/L


 


Potassium   (3.3-5.3)  mmol/L


 


Chloride   ()  mmol/L


 


Carbon Dioxide   (21.0-32.0)  mmol/L


 


Anion Gap   (5-15)  mmol/L


 


BUN   (6-25)  mg/dL


 


Creatinine   (0.51-1.17)  mg/dL


 


Est Cr Clr Drug Dosing   mL/min


 


Estimated GFR (MDRD)   mL/min


 


Glucose  (75 - 99) mg/dL


 


Calcium   (8.7-10.3)  mg/dL


 


Total Bilirubin   (0.2-1.0)  mg/dL


 


AST   (15-37)  U/L


 


ALT   (12-78)  U/L


 


Alkaline Phosphatase   ()  IU/L


 


Creatine Kinase   ()  U/L


 


CK-MB (CK-2)   (0.00-4.30)  ng/mL


 


Troponin I   (0.00-0.070)  ng/mL


 


C-Reactive Protein   (0.0-0.9)  mg/dL


 


B-Natriuretic Peptide   (0-100)  pg/mL


 


Total Protein   (6.4-8.2)  g/dL


 


Albumin   (3.00-4.80)  g/dL


 


Specimen Type  Urincath  


 


Urine Color  Brown H  (YELLOW)  


 


Urine Appearance  Turbid H  (CLEAR)  


 


Urine pH  6.0  (5.0-9.0)  


 


Ur Specific Gravity  1.025  (1.005-1.030)  


 


Urine Protein  100 H  (NEGATIVE)  mg/dL


 


Urine Glucose (UA)  Negative  (NEGATIVE)  mg/dL


 


Urine Ketones  Negative  (NEGATIVE)  mg/dL


 


Urine Occult Blood  Large H  (NEGATIVE)  


 


Urine Nitrite  Negative  (NEGATIVE)  


 


Urine Bilirubin  Small H  (NEGATIVE)  


 


Urine Urobilinogen  1.0  (0.2-1.0)  E.U./dL


 


Ur Leukocyte Esterase  Small H  (NEGATIVE)  


 


Urine RBC  >100 H  (0-5)  /HPF


 


Urine WBC   H  (0-5)  /HPF


 


Ur Epithelial Cells  Rare  /LPF


 


Urine Bacteria  Moderate H  (NONE TO FEW)  /HPF











Meds: 


Medications











Generic Name Dose Route Start Last Admin





  Trade Name Freq  PRN Reason Stop Dose Admin


 


Acetaminophen  650 mg  04/04/19 21:01  





  Tylenol Arthritis Pain  PO   





  0800,1800 PRN   





  Pain   





     





     





     


 


Acetaminophen  650 mg  04/04/19 21:01  





  Tylenol Arthritis Pain  PO   





  Q8HR PRN   





  Pain   





     





     





     


 


Albuterol/Ipratropium  3 ml  04/04/19 21:12  





  Duoneb 3.0-0.5 Mg/3 Ml  NEB   





  Q6HRRT PRN   





  Shortness of Breath   





     





     





     


 


Allopurinol  300 mg  04/05/19 09:00  04/05/19 08:06





  Zyloprim  PO   300 mg





  DAILY [facility]   Administration





     





     





     





     


 


Atorvastatin Calcium  40 mg  04/05/19 21:00  





  Lipitor  PO   





  BEDTIME [facility]   





     





     





     





     


 


Buspirone HCl  10 mg  04/05/19 08:00  04/05/19 08:05





  Buspar  PO   10 mg





  BID@0800,2000 [facility]   Administration





     





     





     





     


 


Chlordiazepoxide HCl  10 mg  04/05/19 08:00  04/05/19 08:07





  Librium  PO   10 mg





  0800,1800 [facility] hospital   Administration





     





     





     





     


 


Esomeprazole Magnesium  40 mg  04/05/19 07:30  





  Nexium  PO   





  0730 YESENIA   





     





     





     





     


 


Furosemide  20 mg  04/05/19 09:00  





  Lasix  PO   





  DAILY [facility] hospital   





     





     





     





     


 


Lisinopril  20 mg  04/05/19 09:00  04/05/19 08:09





  Prinivil  PO   20 mg





  DAILY [facility] hospital   Administration





     





     





     





     


 


Metformin HCl  1,000 mg  04/05/19 08:00  04/05/19 08:05





  Glucophage  PO   1,000 mg





  BID@0800,1800 [facility] hospital   Administration





     





     





     





     


 


Neomycin/Polymyxin/Bacitracin  0 gm  04/05/19 09:00  04/05/19 08:08





  Triple Antibiotic Oint  TOP   1 applic





  DAILY [facility] hospital   Administration





     





     





     





     


 


Nitrofurantoin Macrocrystals  100 mg  04/05/19 09:00  04/05/19 08:06





  Macrobid  PO   100 mg





  BID [facility]   Administration





     





     





     





     


 


Non-Formulary Medication  1 spray  04/04/19 21:01  





  * Zinc Oxide 10%  TOP   





  BID PRN   





  redness to buttocks scrotum gr   





     





     





     


 


Nystatin  0.25 gm  04/04/19 21:00  04/04/19 20:17





  Nystatin Ointment  TOP   1 applic





  QID [facility]ENIA   Administration





     





     





     





     


 


Risperidone  0.25 mg  04/05/19 20:00  





  Risperidal  PO   





  2000 YESENIA   





     





     





     





     


 


Tamsulosin HCl  0.4 mg  04/05/19 09:00  04/05/19 08:07





  Flomax  PO   0.4 mg





  0900 [facility] hospital   Administration





     





     





     





     


 


Terazosin HCl  5 mg  04/05/19 09:00  04/05/19 08:09





  Hytrin  PO   5 mg





  0900 [facility] hospital   Administration





     





     





     





     


 


Tramadol HCl  50 mg  04/04/19 21:01  





  Ultram  PO   





  BEDTIME PRN   





  knee pain   





     





     





     


 


Warfarin Sodium  5 mg  04/05/19 18:00  





  Coumadin  PO   





  Fr@1800 [facility] hospital   





     





     





     





     


 


Warfarin Sodium  7.5 mg  04/06/19 18:00  





  Coumadin  PO   





  SuMoTuWeThSa@1800 [facility] hospital   





     





     





     





     


 


Warfarin Sodium  1 dose  04/05/19 08:45  





  Pharmacy To Dose - Warfarin  .XX   





  ASDIRECTED [facility] hospital   





     





     





     





     














Discontinued Medications














Generic Name Dose Route Start Last Admin





  Trade Name Freq  PRN Reason Stop Dose Admin


 


Furosemide  40 mg  04/04/19 21:15  04/04/19 22:22





  Lasix  IVPUSH  04/04/19 21:16  40 mg





  NOW ONE   Administration





     





     





     





     


 


Lidocaine HCl  Confirm  04/04/19 16:40  04/04/19 20:16





  Xylocaine 2% Jelly  Administered  04/04/19 16:41  Not Given





  Dose   





  5 ml   





  .ROUTE   





  .STK-MED ONE   





     





     





     





     


 


Lidocaine HCl  5 ml  04/04/19 16:40  04/04/19 16:40





  Xylocaine 2% Jelly  TOP  04/04/19 16:41  5 ml





  ONETIME ONE   Administration





     





     





     





     














- Radiology Interpretation


Free Text/Narrative:: 





Chest one view cardiomegaly with atelectasis and small effusion.


CT Results Date: 04/04/19 (see report for multiple findings.)


CT Results Time: 18:10





- Re-Assessments/Exams


Free Text/Narrative Re-Assessment/Exam: 





04/04/19 19:42


Discussed the need for CT as lab values do not correlate with CHF nor MI as 

fluid is seemingly outside the lung with decreased lung expansion.


Continued increase in weight as well as edema to legs despite diuretic 

adjustment.





Asbestos exposure had not been previously acknowledged to any provider that he 

remembers.





Departure





- Departure


Time of Disposition: 20:35


Disposition: Refer to Observation


Condition: Fair


Clinical Impression: 


 Fungal rash of trunk, Hypoxemia requiring supplemental oxygen





Anemia


Qualifiers:


 Anemia type: unspecified type Qualified Code(s): D64.9 - Anemia, unspecified





Ascites


Qualifiers:


 Ascites type: other type Qualified Code(s): R18.8 - Other ascites








- Discharge Information


*PRESCRIPTION DRUG MONITORING PROGRAM REVIEWED*: Not Applicable


*COPY OF PRESCRIPTION DRUG MONITORING REPORT IN PATIENT ROGER: Not Applicable





ED Communication





- Discussed Case With (1)


Discussed Case With (1): Outpatient Provider


Person/s Notified (1): Goran Goodson


Date: 04/04/19


Time Called: 18:55





- Problem List & Annotations


(1) Edema


SNOMED Code(s): 254090772, 420799312


   Code(s): R60.9 - EDEMA, UNSPECIFIED   Status: Chronic   Priority: High   

Current Visit: Yes   


Qualifiers: 


   Edema type: unspecified   Qualified Code(s): R60.9 - Edema, unspecified   





(2) Chronic a-fib


SNOMED Code(s): 962778830


   Code(s): I48.2 - CHRONIC ATRIAL FIBRILLATION   Status: Chronic   Priority: 

Medium   Current Visit: Yes   





(3) Anemia


SNOMED Code(s): 512347698


   Code(s): D64.9 - ANEMIA, UNSPECIFIED   Status: Acute   Priority: Medium   

Current Visit: Yes   


Qualifiers: 


   Anemia type: unspecified type   Qualified Code(s): D64.9 - Anemia, 

unspecified   





(4) Fungal rash of trunk


SNOMED Code(s): 02530694


   Code(s): B36.9 - SUPERFICIAL MYCOSIS, UNSPECIFIED   Status: Acute   Current 

Visit: Yes   





(5) Ecchymosis


SNOMED Code(s): 486606551


   Code(s): R58 - HEMORRHAGE, NOT ELSEWHERE CLASSIFIED   Status: Acute   

Priority: Medium   Current Visit: Yes   





(6) Ascites


SNOMED Code(s): 768159971


   Code(s): R18.8 - OTHER ASCITES   Status: Acute   Priority: High   Current 

Visit: Yes   


Qualifiers: 


   Ascites type: other type   Qualified Code(s): R18.8 - Other ascites   





(7) Atelectasis of both lungs


SNOMED Code(s): 72018616


   Code(s): J98.11 - ATELECTASIS   Status: Chronic   Priority: High   Current 

Visit: Yes   





(8) Thyroid mass of unclear etiology


SNOMED Code(s): 498859364


   Code(s): E07.89 - OTHER SPECIFIED DISORDERS OF THYROID   Status: Acute   

Priority: Medium   Current Visit: Yes   





(9) Splenomegaly


SNOMED Code(s): 96099675


   Code(s): R16.1 - SPLENOMEGALY, NOT ELSEWHERE CLASSIFIED   Status: Acute   

Priority: High   Current Visit: Yes   





(10) History of asbestos exposure


SNOMED Code(s): 064124574


   Code(s): Z77.090 - CONTACT WITH AND (SUSPECTED) EXPOSURE TO ASBESTOS   Status

: Chronic   Priority: High   Current Visit: Yes   Annotation/Comment:: 1957 to 

1959 [employer].   





(11) Hematuria


SNOMED Code(s): 94689099


   Code(s): R31.9 - HEMATURIA, UNSPECIFIED   Status: Chronic   Priority: Medium

   Current Visit: Yes   





- Problem List Review


Problem List Initiated/Reviewed/Updated: Yes





- My Orders


Last 24 Hours: 


My Active Orders





04/04/19 15:20


EKG 12 Lead [EK] Routine 





04/04/19 15:22


Urinary Catheter Assessment [RC] 0900,2100 04/04/19 15:30


Insert Hitchcock Catheter [Insert Urinary Catheter] [OM.PC] Q24H 





04/04/19 21:00


Nystatin [Nystatin Ointment]   0.25 gm TOP QID 














- Assessment/Plan


Last 24 Hours: 


My Active Orders





04/04/19 15:20


EKG 12 Lead [EK] Routine 





04/04/19 15:22


Urinary Catheter Assessment [RC] 0900,2100 04/04/19 15:30


Insert Hitchcock Catheter [Insert Urinary Catheter] [OM.PC] Q24H 





04/04/19 21:00


Nystatin [Nystatin Ointment]   0.25 gm TOP QID 











Plan: 





admission for [place]

## 2019-04-04 NOTE — CR
______________________________________________________________________________   

  

[id number] RAD/RAD Chest PA or AP 1V  

EXAM:  RAD Chest PA or AP 1V  

   

 INDICATION:  FLUID RETENTION.  

   

 COMPARISON:  In April 1, 2019.  

   

 DISCUSSION:    

   

 Cardiomegaly and central vascular congestion.  

   

 Low lung volumes bilaterally with effusions and parenchymal atelectasis.  

   

 Change in appearance of the right cardiac silhouette suggests underlying right  

 middle lobe atelectasis.  

   

 IMPRESSION:  

 Low lung volumes with sequela of fluid retention in the chest.  

   

 Bibasal atelectasis, including possible right middle lobe atelectasis not seen  

 previously.  

   

 Electronically signed by Alex Rodriguez MD on 4/4/2019 4:03 PM  

   

  

Alex Rodriguez MD                 

 04/04/19 1446    

  

Thank you for allowing us to participate in the care of your patient.

## 2019-04-05 LAB
ANION GAP SERPL CALC-SCNC: 2.4 MMOL/L (ref 5–15)
CHLORIDE SERPL-SCNC: 111 MMOL/L (ref 98–115)
SODIUM SERPL-SCNC: 142 MMOL/L (ref 136–145)

## 2019-04-05 RX ADMIN — IOPAMIDOL ONE ML: 755 INJECTION, SOLUTION INTRAVENOUS at 19:00

## 2019-04-05 RX ADMIN — IOPAMIDOL ONE ML: 755 INJECTION, SOLUTION INTRAVENOUS at 18:32

## 2019-04-05 RX ADMIN — NITROFURANTOIN MONOHYDRATE AND NITROFURANTOIN MACROCRYSTALLINE SCH MG: 75; 25 CAPSULE ORAL at 08:06

## 2019-04-05 RX ADMIN — ACETAMINOPHEN PRN MG: 650 TABLET, FILM COATED, EXTENDED RELEASE ORAL at 09:37

## 2019-04-05 RX ADMIN — NITROFURANTOIN MONOHYDRATE AND NITROFURANTOIN MACROCRYSTALLINE SCH MG: 75; 25 CAPSULE ORAL at 20:42

## 2019-04-05 RX ADMIN — POLYMYXIN B SULFATE, BACITRACIN ZINC, NEOMYCIN SULFATE SCH APPLIC: 5000; 3.5; 4 OINTMENT TOPICAL at 08:08

## 2019-04-05 NOTE — PCM.HP
H&P History of Present Illness





- General


Date of Service: 04/05/19


Admit Problem/Dx: 


 Admission Diagnosis/Problem





Admission Diagnosis/Problem      Pulmonary congestion








Source of Information: Patient, Nursing Home Records, Old Records


History Limitations: Reports: No Limitations





- History of Present Illness


Initial Comments - Free Text/Narative: 





This is an 79 year old male who presented to the ED yesterday after being 

evaluated on nursing home rounds. Initially the patient declined to be 

evaluated in the ER, but then agreed several hours later. There were concerns 

of increased edema to both legs and worsening respiratory status that was 

requiring supplemental oxygen. Patient had been seen in the ER on 3/28/19 and 4/ 1/19 due to falls at the nursing home. With the presentation to the ER on 4/1/ 19 it was noted by EMS that his heart rate was 30 and BP was 81/20. He was 

reportedly given a liter of fluid with improvement in orientation status as 

well as heart rate and blood pressure. He was sent back to the nursing home at 

that time. 


Patient had work-up in the ED and was subsequently admitted for further work-up 

and monitoring of his condition. 





- Related Data


Allergies/Adverse Reactions: 


 Allergies











Allergy/AdvReac Type Severity Reaction Status Date / Time


 


omeprazole magnesium Allergy Intermediate Cannot Verified 04/04/19 15:15





[From Prilosec]   Remember  


 


cimetidine [From Tagamet] Allergy  Rash Verified 04/04/19 15:15


 


cimetidine HCl [From Tagamet] Allergy  Rash Verified 04/04/19 15:15


 


codeine Allergy  Hallucinati Verified 04/04/19 15:15





   ons  


 


omeprazole [From Prilosec] Allergy  Cannot Verified 04/04/19 15:15





   Remember  











Home Medications: 


 Home Meds





busPIRone [Buspar] 10 mg PO 0800,2000 12/06/17 [History]


chlordiazePOXIDE [Librium] 10 mg PO 0800,1800 12/06/17 [History]


Acetaminophen [Tylenol Arthritis] 650 mg PO 0800,1800 PRN 05/20/18 [History]


Allopurinol [Zyloprim] 300 mg PO DAILY 05/21/18 [History]


Esomeprazole [NexIUM] 40 mg PO 0730 05/21/18 [History]


metFORMIN HCl [Metformin HCl] 1,000 mg PO 0800,1800 05/21/18 [History]


Tamsulosin [Flomax] 0.4 mg PO 0900 01/10/19 [History]


Terazosin [Hytrin] 5 mg PO 0900 01/10/19 [History]


atorvaSTATin [Lipitor] 40 mg PO BEDTIME 01/10/19 [History]


Lisinopril 20 mg PO DAILY #30 tab 01/13/19 [Rx]


Warfarin [Coumadin] 7.5 mg PO SUMOTUWETHSA 03/28/19 [History]


Furosemide [Lasix] 20 mg PO DAILY 04/01/19 [History]


risperiDONE 0.25 mg PO 2000 04/01/19 [History]


* Zinc Oxide 10% 1 spray TOP BID PRN 04/04/19 [History]


Acetaminophen [Tylenol Arthritis Pain] 650 mg PO Q8HR PRN 04/04/19 [History]


Bacitracin/Neomycin/Polymyxin [Neosporin Oint] 1 applic TOP DAILY 04/04/19 [

History]


Nitrofurantoin Mono/Macrocryst [Nitrofurantoin Mono-MCR] 100 mg PO BID 04/04/19 

[History]


Warfarin [Coumadin] 5 mg PO FR 04/04/19 [History]


traMADol HCl [Tramadol HCl] 50 mg PO BEDTIME PRN 04/04/19 [History]











Past Medical History


HEENT History: Reports: Cataract, Hard of Hearing, Impaired Vision


Cardiovascular History: Reports: Afib, Heart Failure, High Cholesterol, 

Hypertension, MI


Respiratory History: Reports: Pneumonia, Recurrent, Other (See Below) (ASBESTOS 

exposre likely.  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


Gastrointestinal History: Reports: Chronic Constipation, GERD, Hemorrhoids


Genitourinary History: Reports: BPH


Musculoskeletal History: Reports: Arthritis, Back Pain, Chronic


Other Musculoskeletal History: chronic knee pain


Neurological History: Reports: TIA


Other Neuro History: this admit


Psychiatric History: Reports: Anxiety, Dementia, Depression, Mood Swings


Endocrine/Metabolic History: Reports: Diabetes, Type II, Obesity/BMI 30+


Hematologic History: Reports: None


Immunologic History: Reports: None


Oncologic (Cancer) History: Reports: None


Dermatologic History: Reports: Other (See Below)


Other Dermatologic History: scattered scabs, picks/scratches at skin





- Infectious Disease History


Infectious Disease History: Reports: Measles





- Past Surgical History


HEENT Surgical History: Reports: Cataract Surgery, Oral Surgery


Respiratory Surgical History: Reports: None


GI Surgical History: Reports: Cholecystectomy, Colonoscopy, EGD, Hernia, 

Abdominal


Oncologic Surgical History: Reports: None


Dermatological Surgical History: Reports: None





Social & Family History





- Family History


HEENT: Reports: None


Cardiac: Reports: Hypertension, MI


Respiratory: Reports: None


GI: Reports: None


: Reports: None


OBGYN: Reports: None


Musculoskeletal: Reports: None


Neurological: Reports: None


Psychiatric: Reports: None


Endocrine/Metabolic: Reports: None


Hematologic: Reports: None


Immunologic: Reports: None


Dermatologic: Reports: None


Oncologic: Reports: Prostate





- Tobacco Use


Smoking Status *Q: Former Smoker


Used Tobacco, but Quit: Yes


Month/Year Tobacco Last Used: 1980


Second Hand Smoke Exposure: No





- Caffeine Use


Caffeine Use: Reports: Coffee





- Alcohol Use


Date of Last Drink: 01/01/80





- Recreational Drug Use


Recreational Drug Use: No





- Living Situation & Occupation


Living situation: Reports:  (he has dementia with self care deficit.  

His wife provides cares for him at home.  Wanting NH placement)


Occupation: Retired





H&P Review of Systems





- Review of Systems:


Review Of Systems: See Below


General: Reports: Weakness, Fatigue.  Denies: Fever, Chills, Decreased Appetite


HEENT: Reports: Glasses.  Denies: Ear Pain, Headaches, Sinus Congestion, Sore 

Throat


Pulmonary: Reports: Cough.  Denies: Shortness of Breath, Sputum


Cardiovascular: Reports: Edema.  Denies: Chest Pain


Gastrointestinal: Denies: Abdominal Pain, Constipation, Diarrhea, Nausea, 

Vomiting


Genitourinary: Reports: No Symptoms


Skin: Reports: Bruising (from falls)


Neurological: Denies: Dizziness, Headache





Exam





- Exam


Exam: See Below





- Vital Signs


Vital Signs: 


 Last Vital Signs











Temp  98.5 F   04/05/19 07:00


 


Pulse  50 L  04/05/19 07:00


 


Resp  32 H  04/05/19 07:00


 


BP  130/76   04/05/19 08:09


 


Pulse Ox  93 L  04/05/19 09:31











Weight: 336 lb





- Exam


Quality Assessment: Supplemental Oxygen (2.5 liters per nasal cannula 91%), 

Urinary Catheter (dark brown with red sediment present), DVT Prophylaxis (On 

warfarin)


General: Alert, Oriented, Cooperative, Mild Distress.  No: Lethargic, Obtunded


HEENT: Conjunctiva Clear, Hearing Intact, Mucosa Moist & Pink, Posterior 

Pharynx Clear, Pupils Equal, Pupils Reactive, TMs Clear, Glasses


Neck: Supple, Trachea Midline.  No: JVD


Lungs: Decreased Breath Sounds (throughout), Other (Mildly labored respiratory 

effort with intercostal retractions noted).  No: Crackles, Rhonchi, Wheezing


Cardiovascular: Normal S1, Normal S2, Irregular Rhythm, Bradycardia


GI/Abdominal Exam: Normal Bowel Sounds, No Organomegaly, Tender (RUQ), Other (

large, round, firm)


Extremities: Other (1+ edema to BLE)


Skin: Warm, Dry


Neurological: Normal Speech


Neuro Extensive - Mental Status: Alert, Oriented x3, Normal Mood/Affect, Normal 

Cognition


Psychiatric: Alert, Normal Affect, Normal Mood.  No: Anxious, Agitated





- Patient Data


Lab Results Last 24 hrs: 


 Laboratory Results - last 24 hr











  04/04/19 04/04/19 04/04/19 Range/Units





  15:40 15:40 15:40 


 


WBC  6.10    (5.00-10.00)  10^3/uL


 


RBC  4.05 L    (4.50-6.00)  10^6/uL


 


Hgb  11.7 L    (13.0-17.0)  g/dL


 


Hct  38.6 L    (40.0-52.0)  %


 


MCV  95.3 H    (82.0-92.0)  fL


 


MCH  28.9    (27.0-31.0)  pg


 


MCHC  30.3 L    (32.0-36.0)  g/dL


 


RDW  15.0 H    (11.5-14.5)  %


 


Plt Count  139 L    (150-400)  10^3/uL


 


MPV  10.2    (7.4-10.4)  fL


 


Immature Gran % (Auto)  0.2    (0.0-5.0)  %


 


Neut % (Auto)  67.2    (50.0-70.0)  %


 


Lymph % (Auto)  20.0    (20.0-40.0)  %


 


Mono % (Auto)  9.2 H    (2.0-8.0)  %


 


Eos % (Auto)  3.1 H    (1.0-3.0)  %


 


Baso % (Auto)  0.3    (0.0-1.0)  %


 


Immature Gran # (Auto)  0.01    (0.00-0.50)  10^3/uL


 


Neut # (Auto)  4.10    (2.50-7.00)  10^3/uL


 


Lymph # (Auto)  1.22    (1.00-4.00)  10^3/uL


 


Mono # (Auto)  0.56    (0.10-0.80)  10^3/uL


 


Eos # (Auto)  0.19    (0.10-0.30)  10^3/uL


 


Baso # (Auto)  0.02    (0.00-0.10)  10^3/uL


 


PT    29.0 H D  (8.9-11.4)  SEC


 


INR    2.9 H  (0.9-1.1)  


 


Sodium   148 H   (136-145)  mmol/L


 


Potassium   3.6   (3.3-5.3)  mmol/L


 


Chloride   112   ()  mmol/L


 


Carbon Dioxide   27.6   (21.0-32.0)  mmol/L


 


Anion Gap   12.0   (5-15)  mmol/L


 


BUN   21   (6-25)  mg/dL


 


Creatinine   1.00   (0.51-1.17)  mg/dL


 


Est Cr Clr Drug Dosing   64.67   mL/min


 


Estimated GFR (MDRD)   > 60   mL/min


 


Glucose   125 H  (75 - 99) mg/dL


 


POC Glucose     ()  mg/dl


 


Calcium   8.1 L   (8.7-10.3)  mg/dL


 


Total Bilirubin   0.8   (0.2-1.0)  mg/dL


 


AST   63 H   (15-37)  U/L


 


ALT   24   (12-78)  U/L


 


Alkaline Phosphatase   105   ()  IU/L


 


Creatine Kinase   53   ()  U/L


 


CK-MB (CK-2)   0.70   (0.00-4.30)  ng/mL


 


Troponin I   0.07   (0.00-0.070)  ng/mL


 


C-Reactive Protein   1.9 H   (0.0-0.9)  mg/dL


 


B-Natriuretic Peptide   7   (0-100)  pg/mL


 


Total Protein   6.5   (6.4-8.2)  g/dL


 


Albumin   2.82 L   (3.00-4.80)  g/dL


 


Specimen Type     


 


Urine Color     (YELLOW)  


 


Urine Appearance     (CLEAR)  


 


Urine pH     (5.0-9.0)  


 


Ur Specific Gravity     (1.005-1.030)  


 


Urine Protein     (NEGATIVE)  mg/dL


 


Urine Glucose (UA)     (NEGATIVE)  mg/dL


 


Urine Ketones     (NEGATIVE)  mg/dL


 


Urine Occult Blood     (NEGATIVE)  


 


Urine Nitrite     (NEGATIVE)  


 


Urine Bilirubin     (NEGATIVE)  


 


Urine Urobilinogen     (0.2-1.0)  E.U./dL


 


Ur Leukocyte Esterase     (NEGATIVE)  


 


Urine RBC     (0-5)  /HPF


 


Urine WBC     (0-5)  /HPF


 


Ur Epithelial Cells     /LPF


 


Urine Bacteria     (NONE TO FEW)  /HPF














  04/04/19 04/05/19 04/05/19 Range/Units





  17:02 07:45 07:45 


 


WBC    6.16  (5.00-10.00)  10^3/uL


 


RBC    3.97 L  (4.50-6.00)  10^6/uL


 


Hgb    11.7 L  (13.0-17.0)  g/dL


 


Hct    38.0 L  (40.0-52.0)  %


 


MCV    95.7 H  (82.0-92.0)  fL


 


MCH    29.5  (27.0-31.0)  pg


 


MCHC    30.8 L  (32.0-36.0)  g/dL


 


RDW    15.0 H  (11.5-14.5)  %


 


Plt Count    139 L  (150-400)  10^3/uL


 


MPV    10.5 H  (7.4-10.4)  fL


 


Immature Gran % (Auto)    0.2  (0.0-5.0)  %


 


Neut % (Auto)    71.1 H  (50.0-70.0)  %


 


Lymph % (Auto)    16.4 L  (20.0-40.0)  %


 


Mono % (Auto)    10.1 H  (2.0-8.0)  %


 


Eos % (Auto)    1.9  (1.0-3.0)  %


 


Baso % (Auto)    0.3  (0.0-1.0)  %


 


Immature Gran # (Auto)    0.01  (0.00-0.50)  10^3/uL


 


Neut # (Auto)    4.38  (2.50-7.00)  10^3/uL


 


Lymph # (Auto)    1.01  (1.00-4.00)  10^3/uL


 


Mono # (Auto)    0.62  (0.10-0.80)  10^3/uL


 


Eos # (Auto)    0.12  (0.10-0.30)  10^3/uL


 


Baso # (Auto)    0.02  (0.00-0.10)  10^3/uL


 


PT     (8.9-11.4)  SEC


 


INR     (0.9-1.1)  


 


Sodium   142   (136-145)  mmol/L


 


Potassium   3.5   (3.3-5.3)  mmol/L


 


Chloride   111   ()  mmol/L


 


Carbon Dioxide   32.1 H   (21.0-32.0)  mmol/L


 


Anion Gap   2.4 L   (5-15)  mmol/L


 


BUN   20   (6-25)  mg/dL


 


Creatinine   0.97   (0.51-1.17)  mg/dL


 


Est Cr Clr Drug Dosing   66.67   mL/min


 


Estimated GFR (MDRD)   > 60   mL/min


 


Glucose   150 H  (75 - 99) mg/dL


 


POC Glucose     ()  mg/dl


 


Calcium   8.2 L   (8.7-10.3)  mg/dL


 


Total Bilirubin   1.0   (0.2-1.0)  mg/dL


 


AST   46 H   (15-37)  U/L


 


ALT   22   (12-78)  U/L


 


Alkaline Phosphatase   92   ()  IU/L


 


Creatine Kinase     ()  U/L


 


CK-MB (CK-2)     (0.00-4.30)  ng/mL


 


Troponin I     (0.00-0.070)  ng/mL


 


C-Reactive Protein     (0.0-0.9)  mg/dL


 


B-Natriuretic Peptide     (0-100)  pg/mL


 


Total Protein   6.4   (6.4-8.2)  g/dL


 


Albumin   2.73 L   (3.00-4.80)  g/dL


 


Specimen Type  Urincath    


 


Urine Color  Brown H    (YELLOW)  


 


Urine Appearance  Turbid H    (CLEAR)  


 


Urine pH  6.0    (5.0-9.0)  


 


Ur Specific Gravity  1.025    (1.005-1.030)  


 


Urine Protein  100 H    (NEGATIVE)  mg/dL


 


Urine Glucose (UA)  Negative    (NEGATIVE)  mg/dL


 


Urine Ketones  Negative    (NEGATIVE)  mg/dL


 


Urine Occult Blood  Large H    (NEGATIVE)  


 


Urine Nitrite  Negative    (NEGATIVE)  


 


Urine Bilirubin  Small H    (NEGATIVE)  


 


Urine Urobilinogen  1.0    (0.2-1.0)  E.U./dL


 


Ur Leukocyte Esterase  Small H    (NEGATIVE)  


 


Urine RBC  >100 H    (0-5)  /HPF


 


Urine WBC   H    (0-5)  /HPF


 


Ur Epithelial Cells  Rare    /LPF


 


Urine Bacteria  Moderate H    (NONE TO FEW)  /HPF














  04/05/19 04/05/19 Range/Units





  07:45 08:05 


 


WBC    (5.00-10.00)  10^3/uL


 


RBC    (4.50-6.00)  10^6/uL


 


Hgb    (13.0-17.0)  g/dL


 


Hct    (40.0-52.0)  %


 


MCV    (82.0-92.0)  fL


 


MCH    (27.0-31.0)  pg


 


MCHC    (32.0-36.0)  g/dL


 


RDW    (11.5-14.5)  %


 


Plt Count    (150-400)  10^3/uL


 


MPV    (7.4-10.4)  fL


 


Immature Gran % (Auto)    (0.0-5.0)  %


 


Neut % (Auto)    (50.0-70.0)  %


 


Lymph % (Auto)    (20.0-40.0)  %


 


Mono % (Auto)    (2.0-8.0)  %


 


Eos % (Auto)    (1.0-3.0)  %


 


Baso % (Auto)    (0.0-1.0)  %


 


Immature Gran # (Auto)    (0.00-0.50)  10^3/uL


 


Neut # (Auto)    (2.50-7.00)  10^3/uL


 


Lymph # (Auto)    (1.00-4.00)  10^3/uL


 


Mono # (Auto)    (0.10-0.80)  10^3/uL


 


Eos # (Auto)    (0.10-0.30)  10^3/uL


 


Baso # (Auto)    (0.00-0.10)  10^3/uL


 


PT    (8.9-11.4)  SEC


 


INR    (0.9-1.1)  


 


Sodium    (136-145)  mmol/L


 


Potassium    (3.3-5.3)  mmol/L


 


Chloride    ()  mmol/L


 


Carbon Dioxide    (21.0-32.0)  mmol/L


 


Anion Gap    (5-15)  mmol/L


 


BUN    (6-25)  mg/dL


 


Creatinine    (0.51-1.17)  mg/dL


 


Est Cr Clr Drug Dosing    mL/min


 


Estimated GFR (MDRD)    mL/min


 


Glucose   (75 - 99) mg/dL


 


POC Glucose   150 H  ()  mg/dl


 


Calcium    (8.7-10.3)  mg/dL


 


Total Bilirubin    (0.2-1.0)  mg/dL


 


AST    (15-37)  U/L


 


ALT    (12-78)  U/L


 


Alkaline Phosphatase    ()  IU/L


 


Creatine Kinase    ()  U/L


 


CK-MB (CK-2)    (0.00-4.30)  ng/mL


 


Troponin I  0.06   (0.00-0.070)  ng/mL


 


C-Reactive Protein    (0.0-0.9)  mg/dL


 


B-Natriuretic Peptide    (0-100)  pg/mL


 


Total Protein    (6.4-8.2)  g/dL


 


Albumin    (3.00-4.80)  g/dL


 


Specimen Type    


 


Urine Color    (YELLOW)  


 


Urine Appearance    (CLEAR)  


 


Urine pH    (5.0-9.0)  


 


Ur Specific Gravity    (1.005-1.030)  


 


Urine Protein    (NEGATIVE)  mg/dL


 


Urine Glucose (UA)    (NEGATIVE)  mg/dL


 


Urine Ketones    (NEGATIVE)  mg/dL


 


Urine Occult Blood    (NEGATIVE)  


 


Urine Nitrite    (NEGATIVE)  


 


Urine Bilirubin    (NEGATIVE)  


 


Urine Urobilinogen    (0.2-1.0)  E.U./dL


 


Ur Leukocyte Esterase    (NEGATIVE)  


 


Urine RBC    (0-5)  /HPF


 


Urine WBC    (0-5)  /HPF


 


Ur Epithelial Cells    /LPF


 


Urine Bacteria    (NONE TO FEW)  /HPF











Result Diagrams: 


 04/05/19 07:45





 04/05/19 07:45





EKG INTERPRETATION


EKG Date: 04/05/19


Time: 10:49


Rhythm: A-Flutter


Rate (Beats/Min): 55


Axis: LAD-Left Axis Deviation


P-Wave: Absent


QRS: Wide


ST-T: Normal


QT: Normal


Comparison: No Change


Problem List Initiated/Reviewed/Updated: Yes


Orders Last 24hrs: 


 Active Orders 24 hr











 Category Date Time Status


 


 Admission Status [Patient Status] [ADT] Routine ADT  04/04/19 20:52 Ordered


 


 BIPAP Adult [RT BiPAP/CPAP] [RC] .PRN Care  04/04/19 21:13 Active


 


 Chest Physiotherapy [RT Chest Physiotherapy] [RC] Care  04/04/19 21:14 Active





 ASDIRECTED   


 


 EKG Documentation Completion [RC] ASDIRECTED Care  04/05/19 10:38 Active


 


 Elevate Extremity [RC] BID Care  04/04/19 21:10 Active


 


 Insert Hitchcock Catheter [Insert Urinary Catheter] [OM.PC] Care  04/04/19 15:30 

Ordered





 Q24H   


 


 Intake and Output Strict [RC] 0600,1400,2200 Care  04/04/19 21:11 Active


 


 Oxygen Therapy [RC] ASDIRECTED Care  04/05/19 09:31 Active


 


 RT Aerosol Therapy [RC] .PRN Care  04/04/19 21:13 Active


 


 Telemetry Monitoring [Cardiac Monitoring] [RC] .AS Care  04/05/19 10:34 Active





 DIRECTED   


 


 Urinary Catheter Assessment [RC] 0900,2100 Care  04/04/19 15:22 Active


 


 Vital Signs [RC] 0300,0700,1100,1500,1900,2300 Care  04/04/19 23:10 Active


 


 [employer] Diet [DIET] Diet  04/05/19 Breakfast Active


 


 CULTURE URINE [RM] Routine Lab  04/05/19 08:48 Ordered


 


 MISCELLANEOUS CULT [MREF] Routine Lab  04/04/19 19:10 Received


 


 MISCELLANEOUS CULT [MREF] Routine Lab  04/04/19 19:15 Received


 


 * Zinc Oxide 10% Med  04/04/19 21:01 Pending





 1 spray TOP BID PRN   


 


 Acetaminophen [Tylenol Arthritis Pain] Med  04/04/19 21:01 Active





 650 mg PO 0800,1800 PRN   


 


 Acetaminophen [Tylenol Arthritis Pain] Med  04/04/19 21:01 Active





 650 mg PO Q8HR PRN   


 


 Albuterol/Ipratropium [DuoNeb 3.0-0.5 MG/3 ML] Med  04/04/19 21:12 Active





 3 ml NEB Q6HRRT PRN   


 


 Allopurinol [Zyloprim] Med  04/05/19 09:00 Active





 300 mg PO DAILY   


 


 Bacitracin/Neomycin/Polymyxin [Triple Antibiotic Oint] Med  04/05/19 09:00 

Active





 0 gm TOP DAILY   


 


 Furosemide [Lasix] Med  04/05/19 09:00 Hold





 20 mg PO DAILY   


 


 Lisinopril [Prinivil] Med  04/05/19 09:00 Active





 20 mg PO DAILY   


 


 Nitrofurantoin Mono/Macrocryst [Macrobid] Med  04/05/19 09:00 Active





 100 mg PO BID   


 


 Nystatin [Nystatin Ointment] Med  04/04/19 21:00 Active





 0.25 gm TOP QID   


 


 Omeprazole Med  04/06/19 07:30 Active





 40 mg PO 0730   


 


 Pharmacy to Dose - Warfarin Med  04/05/19 08:45 Pending





 1 dose .XX ASDIRECTED   


 


 Tamsulosin [Flomax] Med  04/05/19 09:00 Active





 0.4 mg PO 0900   


 


 Terazosin [Hytrin] Med  04/05/19 09:00 Active





 5 mg PO 0900   


 


 Warfarin [Coumadin] Med  04/05/19 18:00 Active





 5 mg PO Fr@1800   


 


 Warfarin [Coumadin] Med  04/06/19 18:00 Active





 7.5 mg PO SuMoTuWeThSa@1800   


 


 atorvaSTATin [Lipitor] Med  04/05/19 21:00 Hold





 40 mg PO BEDTIME   


 


 busPIRone [Buspar] Med  04/05/19 08:00 Active





 10 mg PO BID@0800,2000   


 


 chlordiazePOXIDE [Librium] Med  04/05/19 08:00 Active





 10 mg PO 0800,1800   


 


 metFORMIN [Glucophage] Med  04/05/19 08:00 Active





 1,000 mg PO BID@0800,1800   


 


 risperiDONE [RisperiDAL] Med  04/05/19 20:00 Active





 0.25 mg PO 2000   


 


 traMADol [Ultram] Med  04/04/19 21:01 Active





 50 mg PO BEDTIME PRN   


 


 Resuscitation Status Routine Resus Stat  04/04/19 23:10 Ordered


 


 EKG 12 Lead [EK] Routine Ther  04/05/19 10:38 Ordered








 Medication Orders





Acetaminophen (Tylenol Arthritis Pain)  650 mg PO 0800,1800 PRN


   PRN Reason: Pain


   Last Admin: 04/05/19 09:37  Dose: 650 mg


Acetaminophen (Tylenol Arthritis Pain)  650 mg PO Q8HR PRN


   PRN Reason: Pain


Albuterol/Ipratropium (Duoneb 3.0-0.5 Mg/3 Ml)  3 ml NEB Q6HRRT PRN


   PRN Reason: Shortness of Breath


Allopurinol (Zyloprim)  300 mg PO DAILY [facility]


   Last Admin: 04/05/19 08:06  Dose: 300 mg


Atorvastatin Calcium (Lipitor)  40 mg PO BEDTIME [facility]EN[facility]


Buspirone HCl (Buspar)  10 mg PO BID@0800,2000 [facility]


   Last Admin: 04/05/19 08:05  Dose: 10 mg


Chlordiazepoxide HCl (Librium)  10 mg PO 0800,1800 [facility]


   Last Admin: 04/05/19 08:07  Dose: 10 mg


Furosemide (Lasix)  20 mg PO DAILY [facility]


Lisinopril (Prinivil)  20 mg PO DAILY [facility]


   Last Admin: 04/05/19 08:09  Dose: 20 mg


Metformin HCl (Glucophage)  1,000 mg PO BID@0800,1800 [facility]


   Last Admin: 04/05/19 08:05  Dose: 1,000 mg


Neomycin/Polymyxin/Bacitracin (Triple Antibiotic Oint)  0 gm TOP DAILY [facility]


   Last Admin: 04/05/19 08:08  Dose: 1 applic


Nitrofurantoin Macrocrystals (Macrobid)  100 mg PO BID [facility]


   Last Admin: 04/05/19 08:06  Dose: 100 mg


Non-Formulary Medication (* Zinc Oxide 10%)  1 spray TOP BID PRN


   PRN Reason: redness to buttocks scrotum gr


Nystatin (Nystatin Ointment)  0.25 gm TOP QID [facility]


   Last Admin: 04/05/19 09:38  Dose: 1 applic


   Admin: 04/04/19 20:17  Dose: 1 applic


Omeprazole (Omeprazole)  40 mg PO 0730 [facility]


Risperidone (Risperidal)  0.25 mg PO 2000 [facility]


Tamsulosin HCl (Flomax)  0.4 mg PO 0900 [facility]


   Last Admin: 04/05/19 08:07  Dose: 0.4 mg


Terazosin HCl (Hytrin)  5 mg PO 0900 [facility]


   Last Admin: 04/05/19 08:09  Dose: 5 mg


Tramadol HCl (Ultram)  50 mg PO BEDTIME PRN


   PRN Reason: knee pain


Warfarin Sodium (Coumadin)  5 mg PO Fr@1800 [facility]


Warfarin Sodium (Coumadin)  7.5 mg PO SuMoTuWeThSa@1800 [facility]


Warfarin Sodium (Pharmacy To Dose - Warfarin)  1 dose .XX ASDIRECTED [facility]








Assessment/Plan Comment:: 





HPI: This is an 79 year old male who presented to the ED yesterday after being 

evaluated on nursing home rounds. Initially the patient declined to be 

evaluated in the ER, but then agreed several hours later. There were concerns 

of increased edema to both legs and worsening respiratory status that was 

requiring supplemental oxygen. Patient had been seen in the ER on 3/28/19 and 4/ 1/19 due to falls at the nursing home. With the presentation to the ER on 4/1/ 19 it was noted by EMS that his heart rate was 30 and BP was 81/20. He was 

reportedly given a liter of fluid with improvement in orientation status as 

well as heart rate and blood pressure. He was sent back to the nursing home at 

that time. 


Patient had work-up in the ED and was subsequently admitted for further work-up 

and monitoring of his condition. 





Pertinent ED workup:


WBC 6.10


Hgb 11.7


Troponin 0.07


CRP 1.9


BNP 7


UA cath moderate bacteria


INR 2.9


EKG-afib


CXR-low lung volumes with fluid retention; bibasal atelectasis with possible 

right middle lobe atelectasis


CT Chest-Bibasal atelectasis R>L; right lung base parenchymal opacification-

correlate for signs of infection; fluid retention in soft tissues and ascites; 

splenomegaly; right thyroid mass-consider thyroid US.





________________________________________________________________________________

______________________________________________________





Further work-up upon arrival to the floor: 


Telemetry


Troponin


Medication reconciliation


Urine culture





Primary assessment/plan: 





Dyspnea, question etiology at this time. Continue oxygen to keep sats >90%. 

DuoNebs PRN. 





Rule out pneumonia. Repeat CBC in AM. 





Rule out arrhythmia. Telemetry ordered. Troponin 0.06. Repeat EKG showing 

atrial flutter with bradycardia. 





Peripheral edema, chronic, improved. Continue with ace wraps to the legs. No 

further lasix today as notable improvement in edema but no change in 

respiratory status. Hold oral lasix. 





Abdominal ascites. 





Asymptomatic bacteruria. Urine culture pending. Previously on macrobid so will 

contiue the same. Hitchcock catheter in place. 





Elevated AST. Improved to 46. Decrease lipitor to 20 mg daily. LDL 61 (March 2018). 





Hypoalbuminemia. 2.73. 





Low hemoglobin. Hgb 11.7, however baseline around 12.7. No active signs of 

bleeding. May consider anemia work-up. 





Thyroid nodule. US (2015) noted multiple nodules to both lobes. Pathology 

report noted atypical follicular lesion of undetermined significance. TSH 4.67 

at that time. No further follow-up since. Will obtain TSH and free T4 level. 





Secondary assessment/plan: 





HTN. Continue lisinopril. 





History of TIAs. 





Atrial flutter, chronic. On warfarin. Pharmacy to dose. 





HFpEF. Holding oral lasix. 





Hyperglyceridemia. Decreasing lipitor dose as above. 





T2DM. Continue metformin. Glucose 150 this AM. Recent A1c 6.1. 





Morbid obesity. 





Dementia. Continue risperidone and librium. 





Anxiety. Continue buspar. 





GERD. Continue omeprazole. 





BPH without symptoms. Continue flomax and hytrin. 





Osteoarthritis. Continue tramadol PRN. 





Gouty arthropathy. Continue allopurinol. 





Chronic bilateral knee pain. 





History of recent falls. 





DVT prophylaxis. On warfarin. INR therapeutic. 





Overall plan:  Repeat labs in the AM. Continue monitoring for any new or 

changing symptoms. May need to consider further work-up of abdominal ascites as 

a reason for dyspnea.

## 2019-04-05 NOTE — CT
______________________________________________________________________________   

  

[id number] CT/CT Abdomen Pelvis W IV  

EXAM: CT Abdomen Pelvis W IV  

   

 CLINICAL DATA: ASCITES  

   

 COMPARISON STUDY: January 25, 2018.  

   

 FINDINGS:  

   

 Small right and trace left pleural effusions with near complete atelectasis of  

 the right lower lobe. The heart is enlarged.   

   

 There is nodular contour of the liver consistent with cirrhosis. The spleen is  

 enlarged measuring up to 19.8 cm. Fatty atrophy of the pancreas. The gallbladder  

 is not visualized and may be absent. Adrenal glands are unremarkable. Bilateral  

 renal cortical cysts. No hydronephrosis or hydroureter. No suspicious renal  

 lesions.  

   

 There are multiple prominent mesenteric lymph nodes none of which are  

 pathologically enlarged by CT size criteria. Hitchcock catheter is in place. The  

 bladder is decompressed. Moderate volume ascites predominantly perihepatic.  

   

 The portal vein is patent.  

   

 No bowel obstruction or inflammation.   

   

 No pneumoperitoneum.  

   

 Scattered changes of spondylosis the spine. No fracture or osseous lesion.  

   

 IMPRESSION:  

   

   

 1. Cirrhosis with sequela of portal hypertension including splenomegaly, small  

 right and trace left pleural effusions as well as small volume ascites.  

   

 2. Mesenteric and retroperitoneal adenopathy was seen on prior studies. This is  

 nonspecific and likely reactive in the setting of cirrhosis.  

   

 Electronically signed by Steve Sarkar MD on 4/5/2019 7:36 PM  

   

  

Steve Sarkar DO                 

 04/1940    

  

Thank you for allowing us to participate in the care of your patient.

## 2019-04-06 LAB
ANION GAP SERPL CALC-SCNC: 12.4 MMOL/L (ref 5–15)
CHLORIDE SERPL-SCNC: 107 MMOL/L (ref 98–115)
SODIUM SERPL-SCNC: 146 MMOL/L (ref 136–145)

## 2019-04-06 RX ADMIN — NITROFURANTOIN MONOHYDRATE AND NITROFURANTOIN MACROCRYSTALLINE SCH MG: 75; 25 CAPSULE ORAL at 20:54

## 2019-04-06 RX ADMIN — NITROFURANTOIN MONOHYDRATE AND NITROFURANTOIN MACROCRYSTALLINE SCH MG: 75; 25 CAPSULE ORAL at 09:10

## 2019-04-06 RX ADMIN — OMEPRAZOLE SCH MG: 20 CAPSULE, DELAYED RELEASE ORAL at 06:32

## 2019-04-06 RX ADMIN — POLYMYXIN B SULFATE, BACITRACIN ZINC, NEOMYCIN SULFATE SCH APPLIC: 5000; 3.5; 4 OINTMENT TOPICAL at 09:11

## 2019-04-06 NOTE — PN
04/06/2019 PATIENT NAME:  ELIER MARTE

 

HISTORY OF PRESENT ILLNESS:  This is an 79-year-old male patient who was

admitted through the emergency room.  He was admitted due to increased edema of

his legs and decline in his respiratory status, which required supplementation

of oxygen.  He had had 2 previous emergency room visits due to falls, which

occurred on 03/28/2019 and 04/01/2019.  These did occur at the nursing center.

He did have significant bruising as a result of those falls.  It is noted that

he did have a heart rate of 30 and a blood pressure of 81/20 on 04/01/2019

emergency room visit.  He did improve with a liter of fluid and was sent back to

the nursing center at that time.  As a result of his most frequent ER visit, he

was admitted for further workup, evaluation and treatment.  Update on his

current status as of today, he denies any pain or discomfort.  He does continue

to note extensive swelling of the lower extremities and ascites of the abdomen.

His ADL status is declining as he is requiring assistance with all ADLs.  He

denies any specific pain or discomfort.  He states that his appetite has been

good and he has had no GI upset.

 

REVIEW OF SYSTEMS:  GENERAL:  He does note that he feels weak and fatigued.  He

denies any fever.  His appetite is improving.

HEENT:  Negative.

RESPIRATORY:  He does note shortness of breath with some intermittent cough.

CARDIOVASCULAR:  He denies any chest pain or palpitations.

GI:  His appetite has been good without nausea or vomiting.

:  He does have a Hitchcock catheter.  It is reported he did remove it

independently x1 and he has had this reinserted.

SKIN:  He has bruises from previous falls.

NEUROLOGICAL:  He denies any headache, dizziness, or lightheadedness.

 

PHYSICAL EXAMINATION:  VITAL SIGNS:  His vital signs have been reviewed.  They

are stable.   His heart rate remains in the 53 to 56 range.

HEENT:  His head is normocephalic.  Conjunctiva is clear.  Noted no nasal

drainage.

NECK:  Supple without rigidity.  No lymphadenopathy.

RESPIRATORY:  Lungs sounds are somewhat diminished throughout.  He has no

crackles, wheezing, or rhonchi.  There is an intermittent cough.

CARDIOVASCULAR:  Has an irregular rhythm.

ABDOMEN:  large, rounded,  bowel sounds present, nontender to palpation.

SKIN:  Note significant bruising throughout the extremities, on the chest wall

and on the right side of his face from previous falls.

EXTREMITIES:  Significant lower extremity edema, firmness.  He does have Ace

wraps intact.  No tenderness to the lower extremities to touch and to palpation.

 

IMPRESSION AND PLAN: Transferred to inpatient care.  Acute hypoxic respiratory 
failure with fluid overload.  O2

required for stability.  A CT scan was obtained noting cirrhosis of the liver

with sequela of portal hypertension including splenomegaly, small right and

trace left pleural effusions.  Secondary diagnoses include abdominal ascites,

asymptomatic bacteremia, elevated AST, low hemoglobin.  Plan, we will

continue with his current treatment.  We are going to add melatonin 6 mg at

bedtime due to concerns of insomnia.  We will add spironolactone 50 mg daily.

His Lasix will be changed to 20 mg IV b.i.d.  We will add trazodone 25 mg as

needed at bedtime for insomnia.  We will obtain a BMP tomorrow and continue with

INRs.  Nursing will continue to monitor I's and O's and also to obtain weights

if possible.

Case discussed with Dr. Cervantes

 

DD:  04/06/2019 18:45:08

DT:  04/06/2019 20:18:51

Job #:  [id number]

MTDD

## 2019-04-07 LAB
ANION GAP SERPL CALC-SCNC: 12.6 MMOL/L (ref 5–15)
CHLORIDE SERPL-SCNC: 105 MMOL/L (ref 98–115)
SODIUM SERPL-SCNC: 144 MMOL/L (ref 136–145)

## 2019-04-07 RX ADMIN — OMEPRAZOLE SCH MG: 20 CAPSULE, DELAYED RELEASE ORAL at 06:36

## 2019-04-07 RX ADMIN — ACETAMINOPHEN PRN MG: 650 TABLET, FILM COATED, EXTENDED RELEASE ORAL at 05:39

## 2019-04-07 RX ADMIN — POLYMYXIN B SULFATE, BACITRACIN ZINC, NEOMYCIN SULFATE SCH APPLIC: 5000; 3.5; 4 OINTMENT TOPICAL at 08:18

## 2019-04-07 RX ADMIN — NITROFURANTOIN MONOHYDRATE AND NITROFURANTOIN MACROCRYSTALLINE SCH MG: 75; 25 CAPSULE ORAL at 20:06

## 2019-04-07 RX ADMIN — NITROFURANTOIN MONOHYDRATE AND NITROFURANTOIN MACROCRYSTALLINE SCH MG: 75; 25 CAPSULE ORAL at 08:19

## 2019-04-08 VITALS — SYSTOLIC BLOOD PRESSURE: 112 MMHG | DIASTOLIC BLOOD PRESSURE: 62 MMHG

## 2019-04-08 LAB
ANION GAP SERPL CALC-SCNC: 11.6 MMOL/L (ref 5–15)
CHLORIDE SERPL-SCNC: 106 MMOL/L (ref 98–115)
SODIUM SERPL-SCNC: 144 MMOL/L (ref 136–145)

## 2019-04-08 RX ADMIN — POLYMYXIN B SULFATE, BACITRACIN ZINC, NEOMYCIN SULFATE SCH APPLIC: 5000; 3.5; 4 OINTMENT TOPICAL at 10:15

## 2019-04-08 RX ADMIN — NITROFURANTOIN MONOHYDRATE AND NITROFURANTOIN MACROCRYSTALLINE SCH MG: 75; 25 CAPSULE ORAL at 08:48

## 2019-04-08 RX ADMIN — OMEPRAZOLE SCH MG: 20 CAPSULE, DELAYED RELEASE ORAL at 06:32
